# Patient Record
Sex: MALE | Race: WHITE | NOT HISPANIC OR LATINO | Employment: OTHER | URBAN - METROPOLITAN AREA
[De-identification: names, ages, dates, MRNs, and addresses within clinical notes are randomized per-mention and may not be internally consistent; named-entity substitution may affect disease eponyms.]

---

## 2019-02-11 ENCOUNTER — PATIENT OUTREACH (OUTPATIENT)
Dept: HEALTH INFORMATION MANAGEMENT | Facility: OTHER | Age: 74
End: 2019-02-11

## 2019-02-11 NOTE — PROGRESS NOTES
Outcome: Left Message    Please transfer to Patient Outreach Team at [phone] when patient returns call.    WebIZ Checked & Epic Updated:  yes    [facility]Connect Verified: yes    Attempt # 1

## 2019-02-11 NOTE — PROGRESS NOTES
Outcome: SCHEDULED SCP / AHA --- PT WILL WAIT FOR AWV    Please transfer to Patient Outreach Team at [phone] when patient returns call.    WebIZ Checked & Epic Updated:  yes    HealthConnect Verified: yes    Attempt # 2

## 2019-05-22 ENCOUNTER — OFFICE VISIT (OUTPATIENT)
Dept: MEDICAL GROUP | Facility: PHYSICIAN GROUP | Age: 74
End: 2019-05-22
Payer: MEDICARE

## 2019-05-22 VITALS
OXYGEN SATURATION: 99 % | BODY MASS INDEX: 26.05 KG/M2 | TEMPERATURE: 97.4 F | DIASTOLIC BLOOD PRESSURE: 78 MMHG | RESPIRATION RATE: 16 BRPM | WEIGHT: 182 LBS | HEIGHT: 70 IN | SYSTOLIC BLOOD PRESSURE: 132 MMHG | HEART RATE: 60 BPM

## 2019-05-22 DIAGNOSIS — M76.51 PATELLAR TENDINITIS OF RIGHT KNEE: ICD-10-CM

## 2019-05-22 DIAGNOSIS — R52 PAIN: Primary | ICD-10-CM

## 2019-05-22 PROCEDURE — 99204 OFFICE O/P NEW MOD 45 MIN: CPT | Performed by: FAMILY MEDICINE

## 2019-05-22 RX ORDER — ATORVASTATIN CALCIUM 40 MG/1
40 TABLET, FILM COATED ORAL DAILY
COMMUNITY
End: 2021-09-08 | Stop reason: SDUPTHER

## 2019-05-22 ASSESSMENT — PATIENT HEALTH QUESTIONNAIRE - PHQ9: CLINICAL INTERPRETATION OF PHQ2 SCORE: 0

## 2019-05-22 NOTE — PATIENT INSTRUCTIONS
Ask your [facility] doctor about a measles titer and if it is safe to use an anti-inflammatory like advil or aleve.    Nataliia mayer

## 2019-05-22 NOTE — LETTER
[facility]  Lo Fine M.D.  [address] [place] [address]  Fax: [phone]   Authorization for Release/Disclosure of   Protected Health Information   Name: ED ALVAREZ : 1945 SSN: [id number]   [address] [place] [address] Phone:    [phone] (home)    I authorize the entity listed below to release/disclose the PHI below to:   [facility]/Lo Fine M.D. and Lo Fine M.D.   Provider or Entity Name:  [facility]   Address   City, State, Zip   Phone:      Fax:     Reason for request: continuity of care   Information to be released:    [  ] LAST COLONOSCOPY,  including any PATH REPORT and follow-up  [  ] LAST FIT/COLOGUARD RESULT [  ] LAST DEXA  [  ] LAST MAMMOGRAM  [  ] LAST PAP  [  ] LAST LABS [  ] RETINA EXAM REPORT  [  ] IMMUNIZATION RECORDS  [X] Release all info      [  ] Check here and initial the line next to each item to release ALL health information INCLUDING  _____ Care and treatment for drug and / or alcohol abuse  _____ HIV testing, infection status, or AIDS  _____ Genetic Testing    DATES OF SERVICE OR TIME PERIOD TO BE DISCLOSED: _____________  I understand and acknowledge that:  * This Authorization may be revoked at any time by you in writing, except if your health information has already been used or disclosed.  * Your health information that will be used or disclosed as a result of you signing this authorization could be re-disclosed by the recipient. If this occurs, your re-disclosed health information may no longer be protected by State or Federal laws.  * You may refuse to sign this Authorization. Your refusal will not affect your ability to obtain treatment.  * This Authorization becomes effective upon signing and will  on (date) __________.      If no date is indicated, this Authorization will  one (1) year from the signature date.    Name: Ed Alvarez    Signature:   Date:     2019       PLEASE FAX  REQUESTED RECORDS BACK TO: [phone]

## 2019-06-07 ENCOUNTER — PHYSICAL THERAPY (OUTPATIENT)
Dept: PHYSICAL THERAPY | Facility: REHABILITATION | Age: 74
End: 2019-06-07
Attending: FAMILY MEDICINE
Payer: MEDICARE

## 2019-06-07 DIAGNOSIS — M76.51 PATELLAR TENDINITIS OF RIGHT KNEE: ICD-10-CM

## 2019-06-07 PROCEDURE — 97162 PT EVAL MOD COMPLEX 30 MIN: CPT

## 2019-06-07 PROCEDURE — 97110 THERAPEUTIC EXERCISES: CPT

## 2019-06-07 ASSESSMENT — ENCOUNTER SYMPTOMS
PAIN SCALE AT LOWEST: 0
PAIN SCALE: 0
QUALITY: SHARP
ALLEVIATING FACTORS: REST
PAIN TIMING: WHEN ACTIVE
PAIN SCALE AT HIGHEST: 6

## 2019-06-07 NOTE — OP THERAPY EVALUATION
Outpatient Physical Therapy  INITIAL EVALUATION    [facility]  [address], [address]  [place] [address]  Phone:  [phone]    Date of Evaluation: 2019    Patient: Ed Alvarez  YOB: 1945  MRN: [id number]     Referring Provider: Lo Fine M.D.  [address]  [place] [address]   Referring Diagnosis Patellar tendinitis of right knee [M76.51]     Time Calculation  Start time: 1100  Stop time: 1200 Time Calculation (min): 60 minutes     Physical Therapy Occurrence Codes    Date of onset of impairment:  19   Date physical therapy care plan established or reviewed:  19   Date physical therapy treatment started:  19          Chief Complaint: Knee Problem    Visit Diagnoses     ICD-10-CM   1. Patellar tendinitis of right knee M76.51         Subjective:   History of Present Illness:     Date of onset:  2019    Mechanism of injury:  Pt reports R knee pain that started when he bent down and put pressure on his R knee to get something off the floor about 6 weeks ago. About a week ago, he started icing it 2x/day for about 10 min each and has noted improvement in symptoms. It does not seem to hurt at any other time. It is still very tender to touch and pressure. Currently, walking and swimming, which he does for exercise, are not usually aggravating; walking very occasionally causes some discomfort, but it is minimal.    Hx: L Achilles tendon partial tear playing tennis.  Pain:     Current pain ratin    At best pain ratin    At worst pain ratin    Location:  Anterior R knee, just lateral to patellar tendon.    Quality:  Sharp    Pain timing:  When active    Relieving factors:  Rest    Exacerbated by: Touch and light pressure.    Progression:  Improving  Diagnostic Tests:     None    Treatments:     None    Patient Goals:     Patient goals for therapy:  Decreased pain and return to sport/leisure activities    Other patient  goals:  Decrease pain in knee.      Past Medical History:   Diagnosis Date   • Hyperlipidemia      Past Surgical History:   Procedure Laterality Date   • LAMINOTOMY  1980s   • TONSILLECTOMY  1950s     Social History   Substance Use Topics   • Smoking status: Former Smoker     Packs/day: 0.15     Years: 50.00     Quit date: 2007   • Smokeless tobacco: Never Used   • Alcohol use Yes      Comment: occasional      Family and Occupational History     Social History   • Marital status: Single     Spouse name: N/A   • Number of children: N/A   • Years of education: N/A       Objective     Observations     Additional Observation Details  R calf atrophy vs L.    Postural Observations  Seated posture: fair  Standing posture: fair    Additional Postural Observation Details  Forward head posture.  No significant pelvic, knee, or ankle deviations in relaxed standing.        Hip Screen   Hip range of motion within functional limits with the following exceptions: CATHI: L 75%.  R 50%.    Supine 90/90 R hip IR 30 deg c mild ollie-lateral pinching at end range.    Hamstring 90/90:  L -20  R -40      Tenderness     Additional Tenderness Details  ++TTP along R patellar tendon.  -TTP medial and lateral joint lines.    Active Range of Motion   Left Knee   Flexion: WFL  Extension: WFL    Right Knee   Flexion: WFL  Extension: WFL    Additional Active Range of Motion Details  -ROS with flxn or extn overpressure.    Patellar Mobility     Additional Patellar Mobility Details  * R patellar mobility limited inferiorly and medial-lateral directions.    Strength:      Left Hip   Planes of Motion   Abduction: 4  External rotation: 4    Right Hip   Planes of Motion   Abduction: 3+  External rotation: 3+    Tests     Right Knee   Negative lateral Steven, medial Steven and patellar apprehension.     Additional Tests Details  L ankle hypomobile, very stiff end feel, into DF.  Ambulation     Comments   Slightly limited ankle DF bilaterally in  terminal stance.  Increased femoral IR on R.    Functional Assessment     Comments  Single Limb Balance:  R: increased lateral trunk sway and ankle compensation vs. L.    Single Limb Heel Raises:  L 6.  R 0 - unable to achieve full height, although able to do multiple repetitions.        Therapeutic Exercises (CPT 86403):     1. TB massage R quad, HEP    2. Supine hip flexor stretch, 2x30 sec ea, HEP focus on R    3. S/Ling clams, 1x fatigue, HEP focus on R    4. DL Heel raises, Add to HEP next session, bias R    Therapeutic Treatments and Modalities:     1. Manual Therapy (CPT 85488), 1. FR STM R anterior hip and quad., 2. R patellar mobs, all directions., // Improved patellar mobility.    Time-based treatments/modalities:  Manual therapy minutes (CPT 31804): 5 minutes  Therapeutic exercise minutes (CPT 44625): 15 minutes       Assessment, Response and Plan:   Impairments: abnormal or restricted ROM, impaired physical strength, lacks appropriate home exercise program, limited mobility and pain with function    Assessment details:  The patient is a 73 y.o. male with c/c of pain in his R anterior knee, just lateral to the patellar tendon.  Signs/symptoms consistent with irritation to the R patellar tendon, likely due mechanism of injury involving increased stretch across anterior knee from hip flexor restriction in combination with mechanical pressure from 1/2 kneel position. Significant contributing factors include R hip extension mobility and abduction/external rotation strength deficits, and calf length and strength deficits. He will benefit from skilled PT to address the above impairments, improve tibio-femoral and patello-femoral alignment during dynamic activities, provide education on a self-managed HEP, and facilitate a safe return to PLOF with minimal to no pain.    Barriers to therapy:  None  Prognosis: good    Goals:   Short Term Goals:   1. Independent with HEP.  2. Able to walk for at least 1.5 miles,  part of his usual exercise program 2x/wk, on even surfaces at self selected pace without limitation by or aggravation of knee pain.  3. Able to ascend/descend at least one flight of stairs without aggravation of or limitation by knee pain.  Short term goal time span:  2-4 weeks      Long Term Goals:    1. Independent with HEP and progressions/regressions of exercises and activities as necessary.  2. Able to walk for at least 2.5 miles, part of his usual exercise program 3x/wk, on even surfaces at self selected pace without limitation by or aggravation of knee pain.  3. Able to kneel on floor to retrieve items from floor level without limitation by or aggravation of knee pain.  Long term goal time span:  6-8 weeks    Plan:   Therapy options:  Physical therapy treatment to continue  Planned therapy interventions:  E Stim Unattended (CPT 92488), Functional Training, Self Care (CPT 34547), Gait Training (CPT 66211), Hot or Cold Pack Therapy (CPT 57431), Manual Therapy (CPT 53741), Neuromuscular Re-education (CPT 50165), Self Care ADL Training (CPT 12279), Therapeutic Activities (CPT 79809), Therapeutic Exercise (CPT 91416) and TENS Applicaton (CPT 08738)  Frequency:  2x week  Duration in weeks:  6  Duration in visits:  12  Discussed with:  Patient      Functional Limitations and Severity Modifiers  WOMAC Grand Total: 9.38     Referring provider co-signature:  I have reviewed this plan of care and my co-signature certifies the need for services.  Certification Dates:   From 06/07/2019     To 08/02/2019    Physician Signature: ________________________________ Date: ______________

## 2019-06-13 ENCOUNTER — PHYSICAL THERAPY (OUTPATIENT)
Dept: PHYSICAL THERAPY | Facility: REHABILITATION | Age: 74
End: 2019-06-13
Attending: FAMILY MEDICINE
Payer: MEDICARE

## 2019-06-13 DIAGNOSIS — M76.51 PATELLAR TENDINITIS OF RIGHT KNEE: ICD-10-CM

## 2019-06-13 PROCEDURE — 97140 MANUAL THERAPY 1/> REGIONS: CPT

## 2019-06-13 PROCEDURE — 97112 NEUROMUSCULAR REEDUCATION: CPT

## 2019-06-13 PROCEDURE — 97110 THERAPEUTIC EXERCISES: CPT

## 2019-06-13 NOTE — OP THERAPY DAILY TREATMENT
Outpatient Physical Therapy  DAILY TREATMENT     [facility] [place] [address], [address]  [place] [address]  Phone:  [phone]    Date: 06/13/2019    Patient: Ed Alvarez  YOB: 1945  MRN: [id number]     Time Calculation  Start time: 1330  Stop time: 1410 Time Calculation (min): 40 minutes     Chief Complaint: Knee Problem    Visit #: 2    SUBJECTIVE:  Felt fine after last session. Sensitivity to touch along patellar tendon has been improving - has been improving since starting ice.    OBJECTIVE:  Current objective measures:  P1: R patellar tendon, very ++TTP. Unable to kneel.    Alignment:  R lateral trunk shift - likely compensation for prior L knee injury.          Therapeutic Exercises (CPT 94483):     1. SKTC, 2x30 sec, HEP    2. S/Ling clams, HEP    3. S/Ling hip abd daniel hold, With PT assist, R 1x7, 5 sec holds, // More difficult on R.    4. Prone hip rotations, Raquel, L more difficult to control.    6. Squats c raquel UE support, 2x7, // No discomfort.    7. Heel raises, DL 1x15.  DL biasing R 1x12.    Therapeutic Treatments and Modalities:     1. Manual Therapy (CPT 04055), 1. FR STM R quad and anterior hip., 2. Patellar mobs all directions; gentle medial-lateral mobilizations to patellar tendon.    4. Neuromuscular Re-education (CPT 48463), 1. Correction of trunk position from R lateral shift to neutral.    Time-based treatments/modalities:  Manual therapy minutes (CPT 68660): 10 minutes  Therapeutic exercise minutes (CPT 32937): 20 minutes  Neuromusc re-ed, balance, coor, post minutes (CPT 31921): 10 minutes       Pain rating before treatment: 0  Pain rating after treatment: 0    ASSESSMENT:   R lateral trunk shift likely due to previous L knee injury, R calf weakness, and R hip abduction/ER weakness are likely contributing to increased R quadriceps use and excessive stress through R patellar tendon.    Will benefit from continued skilled PT to address these  impairments and to progress HEP to facilitate improved symmetry of weight bearing and reduce risk of future injury.    PLAN/RECOMMENDATIONS:   Plan for treatment: therapy treatment to continue next visit.  Planned interventions for next visit: continue with current treatment.

## 2019-06-21 ENCOUNTER — PHYSICAL THERAPY (OUTPATIENT)
Dept: PHYSICAL THERAPY | Facility: REHABILITATION | Age: 74
End: 2019-06-21
Attending: FAMILY MEDICINE
Payer: MEDICARE

## 2019-06-21 DIAGNOSIS — M76.51 PATELLAR TENDINITIS OF RIGHT KNEE: ICD-10-CM

## 2019-06-21 PROCEDURE — 97140 MANUAL THERAPY 1/> REGIONS: CPT

## 2019-06-21 PROCEDURE — 97110 THERAPEUTIC EXERCISES: CPT

## 2019-06-21 NOTE — OP THERAPY DAILY TREATMENT
Outpatient Physical Therapy  DAILY TREATMENT     [facility] Outpatient Physical Therapy [place] [address] [facility], [address]  [place] [address]  Phone:  [phone]    Date: 06/21/2019    Patient: Ed Alvarez  YOB: 1945  MRN: [id number]     Time Calculation  Start time: 0930  Stop time: 1000 Time Calculation (min): 30 minutes     Chief Complaint: Knee Problem    Visit #: 3    SUBJECTIVE:  Felt fine after last session. Knee has been feeling okay. He has kneeled a few times and knee has felt the same. He is able to touch it without pain, but kneeling still hurts.    OBJECTIVE:  Current objective measures:  P1: R patellar tendon, very +TTP. Able to kneel partially on knee.  * +tiss restrictions and swelling noted along lateral patellar area; increased tension in R patella vs L.    Alignment:  R lateral trunk shift - likely compensation for prior L knee injury.          Therapeutic Exercises (CPT 19772):     1. Singaporean ball rolls.  SKTC, 2x30 sec, HEP    2. S/Ling clams, HEP    3. S/Ling hip abd daniel hold, With PT assist, R 1x7, 5 sec holds, // More difficult on R.    4. Prone hip rotations, Raquel, L more difficult to control.    6. Squats c raquel UE support, 2x7, // No discomfort.    7. Heel raises, DL 1x15.  DL biasing R 1x12.    8. * SKTC stretch c towel posterior knee, 3x30 sec, HEP    Therapeutic Treatments and Modalities:     1. Manual Therapy (CPT 59105), 1. FR STM R quad and anterior hip., 2. Posterior tibial mobs, hk lying, 3x20 sec, 2 rounds.    Time-based treatments/modalities:  Manual therapy minutes (CPT 42576): 15 minutes  Therapeutic exercise minutes (CPT 43811): 15 minutes       Pain rating before treatment: 0  Pain rating after treatment: 0    ASSESSMENT:   Responded well to posterior tibial mobs with slight lateral bias with improved tolerance for kneeling on R knee.     Will benefit from continued skilled PT to address these impairments and to progress HEP to facilitate improved symmetry of weight  bearing and reduce risk of future injury.    PLAN/RECOMMENDATIONS:   Plan for treatment: therapy treatment to continue next visit.  Planned interventions for next visit: continue with current treatment.

## 2019-06-26 ENCOUNTER — PHYSICAL THERAPY (OUTPATIENT)
Dept: PHYSICAL THERAPY | Facility: REHABILITATION | Age: 74
End: 2019-06-26
Attending: FAMILY MEDICINE
Payer: MEDICARE

## 2019-06-26 DIAGNOSIS — M76.51 PATELLAR TENDINITIS OF RIGHT KNEE: ICD-10-CM

## 2019-06-26 PROCEDURE — 97140 MANUAL THERAPY 1/> REGIONS: CPT

## 2019-06-26 PROCEDURE — 97110 THERAPEUTIC EXERCISES: CPT

## 2019-06-26 NOTE — OP THERAPY DAILY TREATMENT
Outpatient Physical Therapy  DAILY TREATMENT     [facility] Outpatient Physical Therapy [place] [address], [address]  [place] [address]  Phone:  [phone]    Date: 06/26/2019    Patient: Ed Alvarez  YOB: 1945  MRN: [id number]     Time Calculation  Start time: 0930  Stop time: 1000 Time Calculation (min): 30 minutes     Chief Complaint: Knee Problem    Visit #: 4    SUBJECTIVE:  Felt fine after last session. Overall, knee is improving gradually.    OBJECTIVE:  Current objective measures:  P1: R patellar tendon, very +TTP. Able to kneel partially on knee.  * +tiss restrictions and swelling noted along lateral patellar area; increased tension in R patella vs L.    Alignment:  R lateral trunk shift - likely compensation for prior L knee injury.          Therapeutic Exercises (CPT 94207):     1. Gambian ball rolls.  SKTC, 2x30 sec, HEP    2. S/Ling clams, HEP    3. S/Ling hip abd daniel hold, With PT assist, R 1x7, 5 sec holds, Not done 6/26/19.    4. Prone hip rotations, Raquel, L more difficult to control.    6. Squats c raquel UE support, 2x7, // No discomfort.    7. * Heel raises, DL 1x15.  DL biasing R 1x12.    Therapeutic Treatments and Modalities:     1. Manual Therapy (CPT 93664), * 1. Posterior tibial mobs, hk lying, 3x20 sec, 2 rounds., // Improved knee flxn ROM c overpressure., * 2. STM patellar tendon and borders, prox anterior compartment., // Improved wt bearing capacity on R knee. Improved knee flxn ROM c overpressure.    Therapeutic Treatment and Modalities Summary: Previous:  1. FR STM R quad and anterior hip.    Time-based treatments/modalities:  Manual therapy minutes (CPT 40087): 15 minutes  Therapeutic exercise minutes (CPT 05700): 15 minutes       Pain rating before treatment: 0  Pain rating after treatment: 0    ASSESSMENT:   Progressing gradually with improved wt bearing tolerance on R knee in kneeling, but still painful and different from L knee. Knee flx with overpressure improved and wt  bearing tolerance improved at end of session today. Joint accessory motion and local soft tissue restrictions likely contributing to symptoms.    Will benefit from continued skilled PT to address these impairments and to progress HEP to facilitate improved symmetry of weight bearing and reduce risk of future injury.    PLAN/RECOMMENDATIONS:   Plan for treatment: therapy treatment to continue next visit.  Planned interventions for next visit: continue with current treatment.

## 2019-07-05 ENCOUNTER — PHYSICAL THERAPY (OUTPATIENT)
Dept: PHYSICAL THERAPY | Facility: REHABILITATION | Age: 74
End: 2019-07-05
Attending: FAMILY MEDICINE
Payer: MEDICARE

## 2019-07-05 DIAGNOSIS — M76.51 PATELLAR TENDINITIS OF RIGHT KNEE: ICD-10-CM

## 2019-07-05 PROCEDURE — 97140 MANUAL THERAPY 1/> REGIONS: CPT

## 2019-07-05 PROCEDURE — 97110 THERAPEUTIC EXERCISES: CPT

## 2019-07-05 NOTE — OP THERAPY DAILY TREATMENT
Outpatient Physical Therapy  DAILY TREATMENT     [facility] Outpatient Physical Therapy [place] [address] [facility], [address]  [place] [address]  Phone:  [phone]    Date: 07/05/2019    Patient: Ed Alvarez  YOB: 1945  MRN: [id number]     Time Calculation  Start time: 1000  Stop time: 1030 Time Calculation (min): 30 minutes     Chief Complaint: Knee Problem    Visit #: 5    SUBJECTIVE:  Knee feels the same. Had pain kneeling on floor this past week.    OBJECTIVE:  Current objective measures:  P1: R patellar tendon, very +TTP. Able to kneel partially on knee.  * +tiss restrictions noted along L lateral patella.  Kneeling on R knee slightly more sore than last session.          Therapeutic Exercises (CPT 57678):     1. Standing quad stretch, HEP    2. Swiss ball rolls.  SKTC, 1x20 / 2x1min ea, HEP    3. Prone hip rotations, 2x30 sec    7. * Heel raises, DL 1x15.  DL biasing R 1x12.    Therapeutic Treatments and Modalities:     1. Manual Therapy (CPT 59088), 1. Evangelista Test position, IASTM L patellar tendon, x2., // Mild improvement in kneeling tolerance., 2. Evangelista Test position, IASTM L distal, lateral quad, x2., // Significantly improved kneeling tolerance.    Therapeutic Treatment and Modalities Summary: Previous:  * 1. Posterior tibial mobs, hk lying, 3x20 sec, 2 rounds.  1. FR STM R quad and anterior hip.  * 2. STM patellar tendon and borders, prox anterior compartment.    Time-based treatments/modalities:  Manual therapy minutes (CPT 95455): 20 minutes  Therapeutic exercise minutes (CPT 17073): 10 minutes       Direct ice, 3 min, seated, end of session.    Pain rating before treatment: 0  Pain rating after treatment: 0    ASSESSMENT:   Tolerance for kneeling on L knee improved significantly after IASTM to distal lateral quad.    Will benefit from continued skilled PT to address these impairments and to progress HEP to facilitate improved symmetry of weight bearing and reduce risk of future  injury.    PLAN/RECOMMENDATIONS:   Plan for treatment: therapy treatment to continue next visit.  Planned interventions for next visit: continue with current treatment.

## 2019-07-12 ENCOUNTER — PHYSICAL THERAPY (OUTPATIENT)
Dept: PHYSICAL THERAPY | Facility: REHABILITATION | Age: 74
End: 2019-07-12
Attending: FAMILY MEDICINE
Payer: MEDICARE

## 2019-07-12 DIAGNOSIS — M76.51 PATELLAR TENDINITIS OF RIGHT KNEE: ICD-10-CM

## 2019-07-12 PROCEDURE — 97110 THERAPEUTIC EXERCISES: CPT

## 2019-07-12 PROCEDURE — 97140 MANUAL THERAPY 1/> REGIONS: CPT

## 2019-07-12 NOTE — OP THERAPY DISCHARGE SUMMARY
Outpatient Physical Therapy  DISCHARGE SUMMARY NOTE      [facility] [place]  [address], [address]  [place] [address]  Phone:  [phone]    Date of Visit: 07/12/2019    Patient: Ed Alvarez  YOB: 1945  MRN: [id number]     Referring Provider: Lo Fine M.D.  [address]  [facility] [address]  [place] [address]   Referring Diagnosis Patellar tendinitis, right knee [M76.51]     Physical Therapy Occurrence Codes    Date of onset of impairment:  5/22/19   Date physical therapy care plan established or reviewed:  6/7/19   Date physical therapy treatment started:  6/7/19          Functional Limitations and Severity Modifiers  WOMAC Grand Total: 2.08       Your patient is being discharged from Physical Therapy with the following comments:   · Goals partially met    Comments:  Overall, the patient has made moderate progress with PT. He reports less pain with kneeling on his R, but it is still uncomfortable vs his L side. He demonstrates good technique with his HEP and states he is comfortable with DC from PT.         Recommendations:  DC from PT.    Russell Matias, PT, DPT, OCS    Date: 7/12/2019

## 2019-07-12 NOTE — OP THERAPY DAILY TREATMENT
Outpatient Physical Therapy  DAILY TREATMENT     [facility] Outpatient Physical Therapy [place] [address] [facility], [address]  [place] [address]  Phone:  [phone]    Date: 07/12/2019    Patient: Ed Alvarez  YOB: 1945  MRN: [id number]     Time Calculation  Start time: 0830  Stop time: 0900 Time Calculation (min): 30 minutes     Chief Complaint: Knee Problem    Visit #: 6    SUBJECTIVE:  Ability to kneel has improved gradually. Hard to tell if it has been the ice, time, or PT. Will continue working on calf strength.    OBJECTIVE:  Current objective measures:  P1: R patellar tendon, mild +TTP. Able to kneel partially on knee.  * +tiss restrictions noted along L lateral patella.          Therapeutic Exercises (CPT 71842):     1. Standing quad stretch, HEP    2. Swiss ball rolls.  SKTC, 1x20 / 2x1min ea, HEP    3. Prone hip rotations, 2x30 sec, Review    4. S/L clams, Green, 2x12, HEP    7. * Heel raises, DL biasing R 1x12, 1x15., HEP    Therapeutic Treatments and Modalities:     1. Manual Therapy (CPT 99677), 1. Evangelista Test position, IASTM L patellar tendon, x2., 2. Evangelista Test position, IASTM L distal, lateral quad, x2., // Slightly more discomfort kneeling.    Therapeutic Treatment and Modalities Summary: Previous:  * 1. Posterior tibial mobs, hk lying, 3x20 sec, 2 rounds.  1. FR STM R quad and anterior hip.  * 2. STM patellar tendon and borders, prox anterior compartment.    Time-based treatments/modalities:  Manual therapy minutes (CPT 71008): 10 minutes  Therapeutic exercise minutes (CPT 32417): 20 minutes       Direct ice, 3 min, seated, end of session.    Pain rating before treatment: 0  Pain rating after treatment: 0    ASSESSMENT:   Overall, patient has made moderate progress and is able to kneel with slightly less discomfort. He demonstrated good technique with his HEP and states he is comfortable with DC from PT today.      PLAN/RECOMMENDATIONS:   Plan for treatment: Discharge patient due to  partially achievd goals..

## 2019-07-16 ENCOUNTER — TELEPHONE (OUTPATIENT)
Dept: MEDICAL GROUP | Facility: PHYSICIAN GROUP | Age: 74
End: 2019-07-16

## 2019-07-16 NOTE — TELEPHONE ENCOUNTER
ESTABLISHED PATIENT PRE-VISIT PLANNING     Patient was NOT contacted to complete PVP.     Note: Patient will not be contacted if there is no indication to call.     1.  Reviewed notes from the last few office visits within the medical group: Yes    2.  If any orders were placed at last visit or intended to be done for this visit (i.e. 6 mos follow-up), do we have Results/Consult Notes?        •  Labs - Labs were not ordered at last office visit.   Note: If patient appointment is for lab review and patient did not complete labs, check with provider if OK to reschedule patient until labs completed.       •  Imaging - Imaging was not ordered at last office visit.       •  Referrals - Referral ordered, patient was seen and consult notes are in chart. Care Teams updated  NO.    3. Is this appointment scheduled as a Hospital Follow-Up? No    4.  Immunizations were updated in Epic using WebIZ?: Epic matches WebIZ       •  Web Iz Recommendations: PREVNAR (PCV13) , TDAP, VARICELLA (Chicken Pox)  and SHINGRIX (Shingles)    5.  Patient is due for the following Health Maintenance Topics:   Health Maintenance Due   Topic Date Due   • Annual Wellness Visit  1945   • HEPATITIS C SCREENING  1945   • IMM DTaP/Tdap/Td Vaccine (1 - Tdap) 07/18/1966   • COLONOSCOPY  07/18/1997   • IMM ZOSTER VACCINES (1 of 2) 07/18/1997   • IMM PNEUMOCOCCAL 65+ (ADULT) LOW/MEDIUM RISK SERIES (1 of 2 - PCV13) 07/18/2012       - Patient has completed FLU Immunization(s) per WebIZ. Chart has been updated.    6. Orders for overdue Health Maintenance topics pended in Pre-Charting? NO    7.  AHA (MDX) form printed for Provider? YES    8.  Patient was NOT informed to arrive 15 min prior to their scheduled appointment and bring in their medication bottles.

## 2019-07-22 ENCOUNTER — APPOINTMENT (OUTPATIENT)
Dept: MEDICAL GROUP | Facility: PHYSICIAN GROUP | Age: 74
End: 2019-07-22
Payer: MEDICARE

## 2019-08-28 ENCOUNTER — PATIENT OUTREACH (OUTPATIENT)
Dept: HEALTH INFORMATION MANAGEMENT | Facility: OTHER | Age: 74
End: 2019-08-28

## 2019-08-28 NOTE — PROGRESS NOTES
Outcome: Left Message    Please transfer to Patient Outreach Team at [phone] when patient returns call.    }    Attempt # 1

## 2020-02-06 ENCOUNTER — OFFICE VISIT (OUTPATIENT)
Dept: MEDICAL GROUP | Facility: PHYSICIAN GROUP | Age: 75
End: 2020-02-06
Payer: MEDICARE

## 2020-02-06 VITALS
RESPIRATION RATE: 20 BRPM | TEMPERATURE: 97.7 F | DIASTOLIC BLOOD PRESSURE: 74 MMHG | SYSTOLIC BLOOD PRESSURE: 124 MMHG | WEIGHT: 179.2 LBS | OXYGEN SATURATION: 96 % | HEIGHT: 70 IN | HEART RATE: 82 BPM | BODY MASS INDEX: 25.65 KG/M2

## 2020-02-06 DIAGNOSIS — M70.62 GREATER TROCHANTERIC BURSITIS OF BOTH HIPS: ICD-10-CM

## 2020-02-06 DIAGNOSIS — M70.61 GREATER TROCHANTERIC BURSITIS OF BOTH HIPS: ICD-10-CM

## 2020-02-06 PROBLEM — M25.551 PAIN OF BOTH HIP JOINTS: Status: ACTIVE | Noted: 2020-02-06

## 2020-02-06 PROBLEM — M25.552 PAIN OF BOTH HIP JOINTS: Status: ACTIVE | Noted: 2020-02-06

## 2020-02-06 PROCEDURE — 99214 OFFICE O/P EST MOD 30 MIN: CPT | Performed by: FAMILY MEDICINE

## 2020-02-06 NOTE — ASSESSMENT & PLAN NOTE
This is a new condition.  Onset: 2-3 months  Location: lateral, bilateral hip, L>R, right no longer present  Duration: intermittent  Timing: mostly at night, affecting sleep  Quality: deep ache  Precipitating trauma: none  Associated symptoms: +left lumbar pain - last week, +muscle spasm - last week  Home treatments: acetaminophen, ibuprofen - helps    He does take a walk in the hills every morning, 1-1.5 miles. He also swims 3x/week and the last time he swam, it hurt in his hip.

## 2020-02-06 NOTE — PROGRESS NOTES
"Subjective:     CC: hip pain    HPI:   Ed presents today with     Pain of both hip joints  This is a new condition.  Onset: 2-3 months  Location: lateral, bilateral hip, L>R, right no longer present  Duration: intermittent  Timing: mostly at night, affecting sleep  Quality: deep ache  Precipitating trauma: none  Associated symptoms: +left lumbar pain - last week, +muscle spasm - last week  Home treatments: acetaminophen, ibuprofen - helps    He does take a walk in the hills every morning, 1-1.5 miles. He also swims 3x/week and the last time he swam, it hurt in his hip.        Past Medical History:   Diagnosis Date   • Hyperlipidemia        Social History     Tobacco Use   • Smoking status: Former Smoker     Packs/day: 0.15     Years: 50.00     Pack years: 7.50     Last attempt to quit:      Years since quittin.1   • Smokeless tobacco: Never Used   Substance Use Topics   • Alcohol use: Yes     Comment: occasional    • Drug use: No       Current Outpatient Medications Ordered in Epic   Medication Sig Dispense Refill   • atorvastatin (LIPITOR) 40 MG Tab Take 40 mg by mouth every day.     • Cyanocobalamin (VITAMIN B12 PO) Take  by mouth.     • Cholecalciferol (VITAMIN D-3 PO) Take  by mouth.       No current Epic-ordered facility-administered medications on file.        Allergies:  Patient has no known allergies.    Health Maintenance:   Reviewing records    ROS:  Gen: no fevers/chills  Pulm: no sob  CV: no chest pain    Objective:       Exam:  /74 (BP Location: Left arm, Patient Position: Sitting, BP Cuff Size: Adult)   Pulse 82   Temp 36.5 °C (97.7 °F) (Temporal)   Resp 20   Ht 1.778 m (5' 10\")   Wt 81.3 kg (179 lb 3.2 oz)   SpO2 96%   BMI 25.71 kg/m²  Body mass index is 25.71 kg/m².    Gen: Alert and oriented, No apparent distress.  Neck: Neck is supple without lymphadenopathy.  Lungs: Normal effort, CTA bilaterally, no wheezes, rhonchi, or rales  CV: Regular rate and rhythm. No murmurs, " rubs, or gallops.  Ext: No clubbing, cyanosis, edema.  Hip:  Full ROM, full strength, TTP over greater trochanter bilaterally, FADIR test negative bilaterally  Back:  Full ROM, 5/5 LE strength, sensation intact bilaterally in LE, no TTP over spinous processes, paraspinals non-tender, SI joint non-tender, straight leg raise negative, Mario test negative    Assessment & Plan:     74 y.o. male with the following -     1. Greater trochanteric bursitis of both hips  This is a new condition.  For last 2-3 months he has had lateral bilateral hip pain with left side greater than the right.  He states the right pain is no longer present but he still getting pain on the left side.  It mostly occurs at night and is been affecting his sleep when he sleeps on his side.  Last week he did have an episode of left lumbar pain with muscle spasms that he thinks was related to hip pain.  Has been responding to acetaminophen and ibuprofen.  On exam his greater trochanter bursa is tender to palpation and there is no actual pain in the hip joint itself so I suspect greater trochanter bursitis.  - REFERRAL TO PHYSICAL THERAPY Reason for Therapy: Eval/Treat/Report  -If continued pain despite physical therapy, corticosteroid injection    Return in about 6 weeks (around 3/19/2020) for trochanter bursitis, injection?.    Please note that this dictation was created using voice recognition software. I have made every reasonable attempt to correct obvious errors, but I expect that there are errors of grammar and possibly content that I did not discover before finalizing the note.

## 2020-02-19 ENCOUNTER — APPOINTMENT (OUTPATIENT)
Dept: PHYSICAL THERAPY | Facility: REHABILITATION | Age: 75
End: 2020-02-19
Attending: FAMILY MEDICINE
Payer: MEDICARE

## 2020-02-24 ENCOUNTER — APPOINTMENT (OUTPATIENT)
Dept: PHYSICAL THERAPY | Facility: REHABILITATION | Age: 75
End: 2020-02-24
Attending: FAMILY MEDICINE
Payer: MEDICARE

## 2020-02-26 ENCOUNTER — PHYSICAL THERAPY (OUTPATIENT)
Dept: PHYSICAL THERAPY | Facility: REHABILITATION | Age: 75
End: 2020-02-26
Attending: FAMILY MEDICINE
Payer: MEDICARE

## 2020-02-26 DIAGNOSIS — M70.62 GREATER TROCHANTERIC BURSITIS OF BOTH HIPS: ICD-10-CM

## 2020-02-26 DIAGNOSIS — M70.61 GREATER TROCHANTERIC BURSITIS OF BOTH HIPS: ICD-10-CM

## 2020-02-26 PROCEDURE — 97110 THERAPEUTIC EXERCISES: CPT

## 2020-02-26 PROCEDURE — 97162 PT EVAL MOD COMPLEX 30 MIN: CPT

## 2020-02-26 SDOH — ECONOMIC STABILITY: GENERAL: QUALITY OF LIFE: GOOD

## 2020-02-26 ASSESSMENT — ENCOUNTER SYMPTOMS
ALLEVIATING FACTORS: PAIN MEDICATION
QUALITY: DULL ACHE
EXACERBATED BY: WALKING
PAIN SCALE AT LOWEST: 0
PAIN TIMING: CONSTANT
PAIN SCALE: 1
EXACERBATED BY: PROLONGED STANDING
AWAKENINGS PER NIGHT: 2
ALLEVIATING FACTORS: ACTIVITY MODIFICATION
PAIN SCALE AT HIGHEST: 5

## 2020-02-26 NOTE — OP THERAPY EVALUATION
Outpatient Physical Therapy  INITIAL EVALUATION    [facility] [place]  [address]  [place] [address]  Phone:  [phone]    Date of Evaluation: 2020    Patient: Ed Alvarez  YOB: 1945  MRN: [id number]     Referring Provider: Lo Fine M.D.  [address]  [place] [address]   Referring Diagnosis Trochanteric bursitis, right hip [M70.61];Trochanteric bursitis, left hip [M70.62]     Time Calculation  Start time: 1400  Stop time: 1500 Time Calculation (min): 60 minutes           Chief Complaint: Hip Problem    Visit Diagnoses     ICD-10-CM   1. Greater trochanteric bursitis of both hips M70.61    M70.62         Subjective:   History of Present Illness:     Date of onset:  2019    Mechanism of injury:  The patient is a 74 year old male who reports having (R) hip pain then (L) hip pain ~ 2 months ago. He has experienced muscle spasms superior to his (L) hip during the night. He started taking Acetaminophen and then Ibuprofen. The pain primarily still in the (R) hip and then the (L) but no muscle spasms. He has hx of (R) knee bursitis long ago. Hx of (L) knee had a fall in . He swims 3x/week and walks 1-1.5 -2.5 miles at times.  Quality of life:  Good  Sleep disturbance:  Interrupted sleep  # Times/Night awakened:  2  Pain:     Current pain ratin    At best pain ratin    At worst pain ratin    Quality:  Dull ache    Pain timing:  Constant    Relieving factors:  Activity modification and pain medication    Aggravating factors:  Prolonged standing and walking    Pain Comments::  Sidelying on (R) hip increases his pain but he likes to sleep on his (R) side.  Social Support:     Lives in:  One-story house  Hand dominance:  Right  Treatments:     Previous treatment:  Medication    Current treatment:  Medication  Patient Goals:     Other patient goals:  Increase mobility and decrease pain       Past Medical History:    Diagnosis Date   • Hyperlipidemia      Past Surgical History:   Procedure Laterality Date   • LAMINOTOMY     • TONSILLECTOMY  s     Social History     Tobacco Use   • Smoking status: Former Smoker     Packs/day: 0.15     Years: 50.00     Pack years: 7.50     Last attempt to quit:      Years since quittin.1   • Smokeless tobacco: Never Used   Substance Use Topics   • Alcohol use: Yes     Comment: occasional      Family and Occupational History     Socioeconomic History   • Marital status: Single     Spouse name: Not on file   • Number of children: Not on file   • Years of education: Not on file   • Highest education level: Not on file   Occupational History   • Not on file       Objective     Palpation     Right   Tenderness of the gluteus atul and TFL.     Additional Palpation Details  (R) anterior hip tenderness upon point palpation near ASIS     Active Range of Motion   Left Hip   Normal active range of motion    Right Hip   Normal active range of motion    Strength:      Left Hip   Planes of Motion   Flexion: 5  Extension: 5  Abduction: 5  Adduction: 5  External rotation: 5  Internal rotation: 5    Right Hip   Planes of Motion   Flexion: 5  Extension: 5  Abduction: 5  Adduction: 5  External rotation: 5  Internal rotation: 5    Tests     Left Hip   Negative CATHI, FADIR, SI compression and SI distraction.   Evangelista: Negative.   Modified Evangelista: Negative.     Right Hip   Positive modified Anthony.   Negative CATHI, FADIR, SI compression and SI distraction.   Evangelista: Negative.    Modified Evangelista: Negative.         Therapeutic Exercises (CPT 60624):     1. SLR's , 2 x10 reps     2. Sidelying ITB stretch, 2 minutes each side       Time-based treatments/modalities:  Therapeutic exercise minutes (CPT 20279): 10 minutes       Assessment, Response and Plan:   Assessment details:  The patient is a 74 year old male who reports having (R) and (L) hip pain. He apparently developed hip pain ~ 2 months ago  and recently had cramping and muscle spasms to the low back while awaking one morning. He currently has difficulty with putting his socks on and performing heavy tasks weightbearing prolonged periods. The patient has increased tenderness to the (R) ITB and gluteal region upon point palpation. He also has tenderness near the ASIS joint with palpation. He has pain lying on his (R) and (L) side at night.  The patient has signs and symptoms of hip bursitis and would benefit from manual therapy techniques, flexibility ans stretching techniques and strength and conditioning to improve his functional activity.  Barriers to therapy:  None  Prognosis: good    Goals:   Short Term Goals:   1) Indep with HEP   2) Able to perform infrequent standing tasks 25-50% of the time on his feet  3) Donning and doffing shoes and socks with 1 grade less pain on VAS   Short term goal time span:  2-4 weeks      Long Term Goals:    1) Progression/regression advancing with HEP   2) Able to walk longer distances with less hip pain 50% or more   3) Able to sleep at night on either side with less interruptions 50% or more   Long term goal time span:  4-6 weeks    Plan:   Therapy options:  Physical therapy treatment to continue  Planned therapy interventions:  E Stim Unattended (CPT 20435), Functional Training, Self Care (CPT 73693), Gait Training (CPT 10203), Manual Therapy (CPT 31801), Neuromuscular Re-education (CPT 86218), Self Care ADL Training (CPT 33302), Therapeutic Activities (CPT 65553) and Therapeutic Exercise (CPT 30372)  Frequency:  2x week  Duration in weeks:  6  Duration in visits:  12  Discussed with:  Patient      Functional Assessment Used        Referring provider co-signature:  I have reviewed this plan of care and my co-signature certifies the need for services.  Certification Dates:   From 02/26/20   To 04/08/20    Physician Signature: ________________________________ Date: ______________

## 2020-02-26 NOTE — OP THERAPY EVALUATION
Outpatient Physical Therapy  INITIAL EVALUATION    [facility] [place]  [address], [address]  [place] [address]  Phone:  [phone]    Date of Evaluation: 2020    Patient: Ed Alvarez  YOB: 1945  MRN: [id number]     Referring Provider: Lo Fine M.D.  [address]  [place] [address]   Referring Diagnosis No admission diagnoses are documented for this encounter.     Time Calculation                   Chief Complaint: No chief complaint on file.    Visit Diagnoses     ICD-10-CM   1. Greater trochanteric bursitis of both hips M70.61    M70.62         Subjective:   History of Present Illness:     Mechanism of injury:  The patient is a 74 year old male who reports       Past Medical History:   Diagnosis Date   • Hyperlipidemia      Past Surgical History:   Procedure Laterality Date   • LAMINOTOMY     • TONSILLECTOMY       Social History     Tobacco Use   • Smoking status: Former Smoker     Packs/day: 0.15     Years: 50.00     Pack years: 7.50     Last attempt to quit:      Years since quittin.1   • Smokeless tobacco: Never Used   Substance Use Topics   • Alcohol use: Yes     Comment: occasional      Family and Occupational History     Socioeconomic History   • Marital status: Single     Spouse name: Not on file   • Number of children: Not on file   • Years of education: Not on file   • Highest education level: Not on file   Occupational History   • Not on file       Objective    Exercises/Treatment  Time-based treatments/modalities:          Assessment/Response/Plan    Functional Assessment Used        Referring provider co-signature:  I have reviewed this plan of care and my co-signature certifies the need for services.  Certification Dates:   From 20   To {Future Date:53147}    Physician Signature: ________________________________ Date: ______________

## 2020-02-27 NOTE — OP THERAPY DAILY TREATMENT
Outpatient Physical Therapy  DAILY TREATMENT     [facility] [address], [address]  [place] [address]  Phone:  [phone]    Date: 02/28/2020    Patient: Ed Alvarez  YOB: 1945  MRN: [id number]     Time Calculation  Start time: 1300  Stop time: 1330 Time Calculation (min): 30 minutes       Chief Complaint: Hip Problem    Visit #: 2    SUBJECTIVE:  The patient reports having (R) hip pain more to the (L) side lying down at night.     OBJECTIVE:  Current objective measures:     decrease tenderness to the (R) hip      Therapeutic Exercises (CPT 22160):     1. SLR's , 1 x10 reps     2. Sidelying ITB stretch, 2 minutes each side     3. Hip abduction, 1 x10 reps     4. Bridges with adduction/abduction , 1 x10 reps     5. Squats    6. Upright bike, 1 x10 reps     Therapeutic Treatments and Modalities:     1. Manual Therapy (CPT 80216), (R) hip , IASTM to the (R) hip for 10 inutes to gluteal and ITB    2. E Stim Unattended (CPT 49106), (R) hip 15 minutes of IFC electrical stimulation with MHP     Time-based treatments/modalities:  Manual therapy minutes (CPT 29212): 15 minutes  Therapeutic exercise minutes (CPT 91092): 15 minutes       ASSESSMENT:   Response to treatment:   IASTM to the (R) gluteal and ITB; moderate pressure with decreased pain following treatment after 10 minutes; patient able to lie on his (R) sdie with less pain.    PLAN/RECOMMENDATIONS:   Plan for treatment: therapy treatment to continue next visit.  Planned interventions for next visit: continue with current treatment.

## 2020-02-28 ENCOUNTER — PHYSICAL THERAPY (OUTPATIENT)
Dept: PHYSICAL THERAPY | Facility: REHABILITATION | Age: 75
End: 2020-02-28
Attending: FAMILY MEDICINE
Payer: MEDICARE

## 2020-02-28 DIAGNOSIS — M70.61 GREATER TROCHANTERIC BURSITIS OF BOTH HIPS: ICD-10-CM

## 2020-02-28 DIAGNOSIS — M70.62 GREATER TROCHANTERIC BURSITIS OF BOTH HIPS: ICD-10-CM

## 2020-02-28 PROCEDURE — 97110 THERAPEUTIC EXERCISES: CPT

## 2020-02-28 PROCEDURE — 97140 MANUAL THERAPY 1/> REGIONS: CPT

## 2020-02-28 PROCEDURE — 97014 ELECTRIC STIMULATION THERAPY: CPT

## 2020-03-02 ENCOUNTER — APPOINTMENT (OUTPATIENT)
Dept: PHYSICAL THERAPY | Facility: REHABILITATION | Age: 75
End: 2020-03-02
Attending: FAMILY MEDICINE
Payer: MEDICARE

## 2020-03-04 ENCOUNTER — PHYSICAL THERAPY (OUTPATIENT)
Dept: PHYSICAL THERAPY | Facility: REHABILITATION | Age: 75
End: 2020-03-04
Attending: FAMILY MEDICINE
Payer: MEDICARE

## 2020-03-04 DIAGNOSIS — M70.62 GREATER TROCHANTERIC BURSITIS OF BOTH HIPS: ICD-10-CM

## 2020-03-04 DIAGNOSIS — M70.61 GREATER TROCHANTERIC BURSITIS OF BOTH HIPS: ICD-10-CM

## 2020-03-04 PROCEDURE — 97140 MANUAL THERAPY 1/> REGIONS: CPT

## 2020-03-04 PROCEDURE — 97014 ELECTRIC STIMULATION THERAPY: CPT

## 2020-03-04 PROCEDURE — 97110 THERAPEUTIC EXERCISES: CPT

## 2020-03-04 NOTE — OP THERAPY DAILY TREATMENT
Outpatient Physical Therapy  DAILY TREATMENT     [facility] [address], [address]  [place] [address]  Phone:  [phone]    Date: 03/04/2020    Patient: Ed Alvarez  YOB: 1945  MRN: [id number]     Time Calculation  Start time: 1400  Stop time: 1430 Time Calculation (min): 30 minutes       Chief Complaint: Hip Problem    Visit #: 3    SUBJECTIVE:  The patient reports having more cramping to the hamstrings lately while doing his bridging exercises. He has less pain onto his (L) side while lying on his side at night.    OBJECTIVE:  Current objective measures:     decrease pain to the (R) hip and gluteal region; hamstring muscles      Therapeutic Exercises (CPT 17162):     1. SLR's , 1 x10 reps     2. Sidelying ITB stretch, 3 minutes each side     3. Hip abduction, 1 x10 reps     4. Bridges with adduction/abduction     5. Bridges over ball, 1 x10 reps     6. Upright bike, 5 minutes    Therapeutic Treatments and Modalities:     1. Manual Therapy (CPT 98051), (R) hip , IASTM to the (R) hip for 10 minutes to gluteal and bilateral hamstring group in prone     2. E Stim Unattended (CPT 11789), (R) hip 15 minutes of IFC electrical stimulation with MHP     Time-based treatments/modalities:  Manual therapy minutes (CPT 71430): 15 minutes  Therapeutic exercise minutes (CPT 49390): 15 minutes       ASSESSMENT:   Response to treatment:   IASTM to the (R) gluteal and hamstring regions bilaterally; patient had less connective tissue tension to the hamstrings during bridging using thera-ball patient reported.     PLAN/RECOMMENDATIONS:   Plan for treatment: therapy treatment to continue next visit.  Planned interventions for next visit: continue with current treatment.

## 2020-03-06 ENCOUNTER — PHYSICAL THERAPY (OUTPATIENT)
Dept: PHYSICAL THERAPY | Facility: REHABILITATION | Age: 75
End: 2020-03-06
Attending: FAMILY MEDICINE
Payer: MEDICARE

## 2020-03-06 DIAGNOSIS — M70.61 GREATER TROCHANTERIC BURSITIS OF BOTH HIPS: ICD-10-CM

## 2020-03-06 DIAGNOSIS — M70.62 GREATER TROCHANTERIC BURSITIS OF BOTH HIPS: ICD-10-CM

## 2020-03-06 PROCEDURE — 97014 ELECTRIC STIMULATION THERAPY: CPT

## 2020-03-06 PROCEDURE — 97140 MANUAL THERAPY 1/> REGIONS: CPT

## 2020-03-06 PROCEDURE — 97110 THERAPEUTIC EXERCISES: CPT

## 2020-03-06 NOTE — OP THERAPY DAILY TREATMENT
Outpatient Physical Therapy  DAILY TREATMENT     [facility] [address], [address]  [place] [address]  Phone:  [phone]    Date: 03/06/2020    Patient: Ed Alvarez  YOB: 1945  MRN: [id number]     Time Calculation               Chief Complaint: No chief complaint on file.    Visit #: 4    SUBJECTIVE:  The patient reports having tightness and cramping to the bilateral hamstring region     OBJECTIVE:  Current objective measures:     decrease tightness to the bilateral hip and hamstring       Therapeutic Exercises (CPT 72076):     1. SLR's , 1 x10 reps     2. Sidelying ITB stretch, 3 minutes each side     3. Hip abduction, 1 x10 reps     4. Bridges with adduction/abduction , 1 x10 reps with pillow squeezes    5. Bridges over ball, 1 x10 reps     Therapeutic Treatments and Modalities:     1. Manual Therapy (CPT 20603), (R) hip , IASTM to the (R) hip for 10 minutes to gluteal and bilateral hamstring group in prone     2. E Stim Unattended (CPT 15445), (R) hip 15 minutes of IFC electrical stimulation with MHP     Time-based treatments/modalities:            ASSESSMENT:   Response to treatment:   IASTM to the bilateral hamstring and (R) gluteals with moderate pressure; tenderness present with less reorted following tissue mobilization; also addressed bilateral hamstring region; Tolerated well.      PLAN/RECOMMENDATIONS:   Plan for treatment: therapy treatment to continue next visit.  Planned interventions for next visit: continue with current treatment.

## 2020-03-09 ENCOUNTER — PHYSICAL THERAPY (OUTPATIENT)
Dept: PHYSICAL THERAPY | Facility: REHABILITATION | Age: 75
End: 2020-03-09
Attending: FAMILY MEDICINE
Payer: MEDICARE

## 2020-03-09 ENCOUNTER — TELEPHONE (OUTPATIENT)
Dept: MEDICAL GROUP | Facility: PHYSICIAN GROUP | Age: 75
End: 2020-03-09

## 2020-03-09 DIAGNOSIS — M70.61 GREATER TROCHANTERIC BURSITIS OF BOTH HIPS: ICD-10-CM

## 2020-03-09 DIAGNOSIS — M70.62 GREATER TROCHANTERIC BURSITIS OF BOTH HIPS: ICD-10-CM

## 2020-03-09 PROCEDURE — 97110 THERAPEUTIC EXERCISES: CPT

## 2020-03-09 PROCEDURE — 97140 MANUAL THERAPY 1/> REGIONS: CPT

## 2020-03-09 PROCEDURE — 97014 ELECTRIC STIMULATION THERAPY: CPT

## 2020-03-09 NOTE — TELEPHONE ENCOUNTER
ESTABLISHED PATIENT PRE-VISIT PLANNING     Patient was NOT contacted to complete PVP.      1.  Reviewed notes from the last few office visits within the medical group: Yes    2.  If any orders were placed at last visit or intended to be done for this visit (i.e. 6 mos follow-up), do we have Results/Consult Notes?        •  Labs - Labs were not ordered at last office visit.          •  Imaging - Imaging was not ordered at last office visit.       •  Referrals - Referral ordered, patient was seen and consult notes are in chart. Care Teams updated  YES.    3. Is this appointment scheduled as a Hospital Follow-Up? No    4.  Immunizations were updated in Epic using WebIZ?: Epic matches WebIZ       •  Web Iz Recommendations: FLU, TD, VARICELLA (Chicken Pox)  and SHINGRIX (Shingles)    5.  Patient is due for the following Health Maintenance Topics:   Health Maintenance Due   Topic Date Due   • Annual Wellness Visit  1945   • HEPATITIS C SCREENING  1945   • IMM ZOSTER VACCINES (2 of 3) 02/26/2011   • IMM INFLUENZA (1) 09/01/2019       - Patient plans to schedule appointment for Annual Wellness Visit (AWV).    6. Orders for overdue Health Maintenance topics pended in Pre-Charting? NO    7.  AHA (MDX) form printed for Provider? YES    8.  Patient was NOT informed to arrive 15 min prior to their scheduled appointment and bring in their medication bottles.

## 2020-03-09 NOTE — OP THERAPY DAILY TREATMENT
Outpatient Physical Therapy  DAILY TREATMENT     [facility] [place] [address], [address]  [place] [address]  Phone:  [phone]    Date: 03/09/2020    Patient: Ed Alvarez  YOB: 1945  MRN: [id number]     Time Calculation  Start time: 1330  Stop time: 1400 Time Calculation (min): 30 minutes       Chief Complaint: Hip Problem    Visit #: 5    SUBJECTIVE:  The patient reports easier time with performing his HEP with better positioning and less cramping sensations.    OBJECTIVE:  Current objective measures:     strengthening to the LE's; hip flexors, hip abductors      Therapeutic Exercises (CPT 69183):     1. SLR's , 1 x10 reps with 3#    2. Sidelying ITB stretch, 3 minutes each side     3. Hip abduction, 1 x10 reps with 3#    4. Bridges with adduction/abduction using 7# medicine ball, 1 x10 reps with pillow squeezes and using green tband for added resistance    5. Bridges without ball, 1 x10 reps     Therapeutic Treatments and Modalities:     1. Manual Therapy (CPT 65282), (R) and (L)  hip , IASTM to the (R) and (L) hip for 10 minutes to gluteal and bilateral hamstring group in prone     2. E Stim Unattended (CPT 32843), (L) hip 15 minutes of IFC electrical stimulation with MHP     Time-based treatments/modalities:  Manual therapy minutes (CPT 74218): 10 minutes  Therapeutic exercise minutes (CPT 36250): 20 minutes         ASSESSMENT:   Response to treatment:   Patient was able to use 3# ankle weights while performing hip abduction and hip flexor stretches with increased weight to a fatigue response upon hip musculature. Patient occasionally has cramping to the (L) posterior thigh during bridges but tolerates exercises.    PLAN/RECOMMENDATIONS:   Plan for treatment: therapy treatment to continue next visit.  Planned interventions for next visit: continue with current treatment.

## 2020-03-11 ENCOUNTER — APPOINTMENT (OUTPATIENT)
Dept: PHYSICAL THERAPY | Facility: REHABILITATION | Age: 75
End: 2020-03-11
Attending: FAMILY MEDICINE
Payer: MEDICARE

## 2020-03-13 NOTE — OP THERAPY DAILY TREATMENT
Outpatient Physical Therapy  DAILY TREATMENT     [facility] Outpatient Physical Therapy [place] [address] [facility], [address]  [place] [address]  Phone:  [phone]    Date: 03/16/2020    Patient: Ed Alvarez  YOB: 1945  MRN: [id number]     Time Calculation  Start time: 1330  Stop time: 1415 Time Calculation (min): 45 minutes       Chief Complaint: Hip Injury and Hip Problem    Visit #: 6    SUBJECTIVE:  The patient reports (R) hip is giving him trouble sleeping at night. He has been having pain to his (R) knee.    OBJECTIVE:  Current objective measures:     decrease tightness to hips at ITB      Therapeutic Exercises (CPT 20100):     1. SLR's , 1 x10 reps with 3#    2. Sidelying ITB stretch, 3 minutes each side     3. Hip abduction, 1 x10 reps with 3#    4. Bridges with adduction/abduction using 7# medicine ball, 1 x10 reps with pillow squeezes and using green tband for added resistance    5. Upright bike , 8 minutes; seat level 5    Therapeutic Treatments and Modalities:     1. Manual Therapy (CPT 86256), (R) and (L)  hip , IASTM to the (R) and (L) hip for 10 minutes to gluteal and bilateral hamstring group in prone     2. E Stim Unattended (CPT 29728), (L) hip 15 minutes of IFC electrical stimulation with MHP     3. Neuromuscular Re-education (CPT 52551), Hips neuromuscular control, strengthening, balance and stability to trunk     Time-based treatments/modalities:  Manual therapy minutes (CPT 31106): 10 minutes  Therapeutic exercise minutes (CPT 80137): 20 minutes  Neuromusc re-ed, balance, coor, post minutes (CPT 09962): 10 minutes       ASSESSMENT:   Response to treatment:   IASTM to the (L) and (R) gluteals/ hamstring muscle groups; continued tenderness to the (L) hip/glute initially. Patient tolerated treatment with less sensitivity and responded well. Less cramping to hamstrings during ball bridging exercises     PLAN/RECOMMENDATIONS:   Plan for treatment: therapy treatment to continue next  visit.  Planned interventions for next visit: continue with current treatment.

## 2020-03-16 ENCOUNTER — PHYSICAL THERAPY (OUTPATIENT)
Dept: PHYSICAL THERAPY | Facility: REHABILITATION | Age: 75
End: 2020-03-16
Attending: FAMILY MEDICINE
Payer: MEDICARE

## 2020-03-16 DIAGNOSIS — M70.61 GREATER TROCHANTERIC BURSITIS OF BOTH HIPS: ICD-10-CM

## 2020-03-16 DIAGNOSIS — M70.62 GREATER TROCHANTERIC BURSITIS OF BOTH HIPS: ICD-10-CM

## 2020-03-16 PROCEDURE — 97112 NEUROMUSCULAR REEDUCATION: CPT

## 2020-03-16 PROCEDURE — 97140 MANUAL THERAPY 1/> REGIONS: CPT

## 2020-03-16 PROCEDURE — 97110 THERAPEUTIC EXERCISES: CPT

## 2020-03-16 PROCEDURE — 97014 ELECTRIC STIMULATION THERAPY: CPT

## 2020-03-18 ENCOUNTER — PHYSICAL THERAPY (OUTPATIENT)
Dept: PHYSICAL THERAPY | Facility: REHABILITATION | Age: 75
End: 2020-03-18
Attending: FAMILY MEDICINE
Payer: MEDICARE

## 2020-03-18 DIAGNOSIS — M70.61 GREATER TROCHANTERIC BURSITIS OF BOTH HIPS: ICD-10-CM

## 2020-03-18 DIAGNOSIS — M70.62 GREATER TROCHANTERIC BURSITIS OF BOTH HIPS: ICD-10-CM

## 2020-03-18 PROCEDURE — 97014 ELECTRIC STIMULATION THERAPY: CPT

## 2020-03-18 NOTE — OP THERAPY DAILY TREATMENT
Outpatient Physical Therapy  DAILY TREATMENT     [facility] Outpatient Physical Therapy [place] [address] [facility], [address]  [place] [address]  Phone:  [phone]    Date: 03/18/2020    Patient: Ed Alvarez  YOB: 1945  MRN: [id number]     Time Calculation  Start time: 1500  Stop time: 1530 Time Calculation (min): 30 minutes       Chief Complaint: Hip Problem    Visit #: 7    SUBJECTIVE:  The patient reports always being sore to the (R) hip while sleeping on it at night.    OBJECTIVE:  Current objective measures:     -decrease hip tightness/tenderness; increase strengthening to the (R) hip       Therapeutic Exercises (CPT 14941):     1. SLR's , 1 x10 reps with 3#    2. Sidelying ITB stretch, 3 minutes each side     3. Standing hip abduction, 1 x10 reps with 3#    4. Bridges with adduction/abduction using 7# medicine ball, 1 x10 reps with pillow squeezes and using green tband for added resistance    5. Upright bike , 6 minutes; seat level 5    6. Bridges with clamshells, 2 x10 reps with green tb    7. Wall squats , 1 x15 reps, increased fatigue response     Therapeutic Treatments and Modalities:     1. Manual Therapy (CPT 57911), (R) and (L)  hip , IASTM to the (R) and (L) hip for 10 minutes to gluteal and bilateral hamstring group in prone     2. E Stim Unattended (CPT 54083), (L) hip 15 minutes of IFC electrical stimulation with MHP     3. Neuromuscular Re-education (CPT 45059), Hips neuromuscular control, strengthening, balance and stability to trunk     Time-based treatments/modalities:  Manual therapy minutes (CPT 84011): 10 minutes  Therapeutic exercise minutes (CPT 95578): 20 minutes  Neuromusc re-ed, balance, coor, post minutes (CPT 36462): 10 minutes         ASSESSMENT:   Response to treatment:   IASTM to the (R) hip ITB, glute medius; patient had tenderness upon moderate pressure but reported less symptoms in the hip following treatment. Instruction on wall squats using ball; tolerated 1/4 to  1/2 squats before (R) knee pain and fatigue response caused him to discontinue exercise. Advised patient to reset postural positioning so that feet are beyond knees to decrease force at knee joint.    PLAN/RECOMMENDATIONS:   Plan for treatment: therapy treatment to continue next visit.  Planned interventions for next visit: continue with current treatment.

## 2020-03-23 ENCOUNTER — OFFICE VISIT (OUTPATIENT)
Dept: MEDICAL GROUP | Facility: PHYSICIAN GROUP | Age: 75
End: 2020-03-23
Payer: MEDICARE

## 2020-03-23 VITALS
HEIGHT: 70 IN | TEMPERATURE: 97.9 F | SYSTOLIC BLOOD PRESSURE: 120 MMHG | BODY MASS INDEX: 25.8 KG/M2 | DIASTOLIC BLOOD PRESSURE: 74 MMHG | WEIGHT: 180.2 LBS | RESPIRATION RATE: 16 BRPM | HEART RATE: 82 BPM | OXYGEN SATURATION: 97 %

## 2020-03-23 DIAGNOSIS — M25.551 PAIN OF BOTH HIP JOINTS: Primary | ICD-10-CM

## 2020-03-23 DIAGNOSIS — G89.29 CHRONIC PAIN OF LEFT KNEE: ICD-10-CM

## 2020-03-23 DIAGNOSIS — M25.552 PAIN OF BOTH HIP JOINTS: Primary | ICD-10-CM

## 2020-03-23 DIAGNOSIS — M25.562 CHRONIC PAIN OF LEFT KNEE: ICD-10-CM

## 2020-03-23 DIAGNOSIS — L98.9 BUMPS ON SKIN: ICD-10-CM

## 2020-03-23 PROCEDURE — 99214 OFFICE O/P EST MOD 30 MIN: CPT | Performed by: FAMILY MEDICINE

## 2020-03-23 ASSESSMENT — PATIENT HEALTH QUESTIONNAIRE - PHQ9: CLINICAL INTERPRETATION OF PHQ2 SCORE: 0

## 2020-03-23 NOTE — ASSESSMENT & PLAN NOTE
Location: left knee (lateral, up thigh)  Onset: 2014  Duration: intermittent  Quality of Pain: pressure  Mechanical Symptoms: no locking, no catching, +buckling   Systemic Symptoms: +creaking in knee, no fevers/chills  Swelling: yes - [facility]  Precipitating trauma? 2014 - fell down stairs in [place]  Pertinent previous medical/surgical procedures: none

## 2020-03-23 NOTE — PROGRESS NOTES
Subjective:     CC: f/u hip pain    HPI:   Ed presents today with     Pain of both hip joints  This is an acute condition.  But a month ago he had had 2 to 3 months of bilateral, lateral hip pain.  I suspect greater trochanteric bursitis and referred him to physical therapy.  He reports that the pain is completely resolved with physical therapy. He has been graduated from PT and continues to do home exercises. He can now sleep on both sides but feels a little bit still in his right hip. It is much better than it was. Electrical therapy was very helpful.     Chronic pain of left knee  Location: left knee (lateral, up thigh)  Onset:   Duration: intermittent  Quality of Pain: pressure  Mechanical Symptoms: no locking, no catching, +buckling   Systemic Symptoms: +creaking in knee, no fevers/chills  Swelling: yes - lots of fluid [facility] by [facility]  Precipitating trauma?  - fell down stairs in [place]  Pertinent previous medical/surgical procedures: none        Past Medical History:   Diagnosis Date   • Hyperlipidemia        Social History     Tobacco Use   • Smoking status: Former Smoker     Packs/day: 0.15     Years: 50.00     Pack years: 7.50     Last attempt to quit:      Years since quittin.2   • Smokeless tobacco: Never Used   Substance Use Topics   • Alcohol use: Yes     Comment: occasional    • Drug use: No       Current Outpatient Medications Ordered in Epic   Medication Sig Dispense Refill   • atorvastatin (LIPITOR) 40 MG Tab Take 40 mg by mouth every day.     • Cyanocobalamin (VITAMIN B12 PO) Take  by mouth.     • Cholecalciferol (VITAMIN D-3 PO) Take  by mouth.       No current Epic-ordered facility-administered medications on file.        Allergies:  Patient has no known allergies.    Health Maintenance: Completed    ROS:  Gen: no fevers/chills  Pulm: no sob  CV: no chest pain    Objective:     Exam:  /74 (BP Location: Right arm, Patient Position: Sitting, BP Cuff Size: Adult)    "Pulse 82   Temp 36.6 °C (97.9 °F) (Temporal)   Resp 16   Ht 1.778 m (5' 10\")   Wt 81.7 kg (180 lb 3.2 oz)   SpO2 97%   BMI 25.86 kg/m²  Body mass index is 25.86 kg/m².    Gen: Alert and oriented, No apparent distress.  Neck: Neck is supple without lymphadenopathy.  Lungs: Normal effort, CTA bilaterally, no wheezes, rhonchi, or rales  CV: Regular rate and rhythm. No murmurs, rubs, or gallops.  Ext: No clubbing, cyanosis, edema.  L Knee: Full ROM, full strength, TTP not present, edema not present, varus/valgus stress negative, anterior/posterior drawer negative, Lachman's negative.    Assessment & Plan:     74 y.o. male with the following -     1. Pain of both hip joints  This is an acute condition, improved.  His last appointment I suspected bilateral greater trochanteric bursitis and has been seeing physical therapy.  Today he reports a significant improvement in his symptoms.  He states he can now sleep on his side again.  He still get some intermittent pain on the right side but is continue to do his home physical therapy exercises.  Therefore, we can hold off on an injection for now and give him more time with the home physical therapy exercises.    2. Chronic pain of left knee  This is a chronic condition, new to me.  He states in 2014 he fell down some stairs injuring his left knee pretty significantly while in [place].  When he came back he [facility] who removed \"a lot of fluid\" from the knee.  Ever since then he has had intermittent pressure and pain in the left knee with some buckling and creaking.  No other previous procedures or surgeries.  His knee exam is benign and I cannot elicit the pain on exam today.  - REFERRAL TO ORTHOPEDICS    3. Bumps on skin  This is a new condition.  He states that when he gets a cold he used to get for 5 bumps along the medial aspect X finger.  The elbow now feels lumps around his scalp.  When the wound resolves the bumps will resolve within a couple of weeks.  " They are nonpainful or itchy bumps.  Bumps are not present right now.  I am not certain what are causing these bumps but they could be viral almost like cold sores that show up during an illness.    Return in about 6 months (around 9/23/2020) for Medicare annual/wellness visit.    Please note that this dictation was created using voice recognition software. I have made every reasonable attempt to correct obvious errors, but I expect that there are errors of grammar and possibly content that I did not discover before finalizing the note.

## 2020-03-23 NOTE — ASSESSMENT & PLAN NOTE
This is an acute condition.  But a month ago he had had 2 to 3 months of bilateral, lateral hip pain.  I suspect greater trochanteric bursitis and referred him to physical therapy.  He reports that the pain is completely resolved with physical therapy. He has been graduated from PT and continues to do home exercises. He can now sleep on both sides but feels a little bit still in his right hip. It is much better than it was. Electrical therapy was very helpful.

## 2020-04-16 ENCOUNTER — PATIENT OUTREACH (OUTPATIENT)
Dept: HEALTH INFORMATION MANAGEMENT | Facility: OTHER | Age: 75
End: 2020-04-16

## 2020-04-16 NOTE — PROGRESS NOTES
Outcome: Left Message- SCP PA     Please transfer to Patient Outreach Team at [phone] when patient returns call.    HealthConnect Verified: yes    Attempt # 1

## 2020-04-20 ENCOUNTER — TELEPHONE (OUTPATIENT)
Dept: HEALTH INFORMATION MANAGEMENT | Facility: OTHER | Age: 75
End: 2020-04-20

## 2020-04-20 ENCOUNTER — OFFICE VISIT (OUTPATIENT)
Dept: MEDICAL GROUP | Facility: PHYSICIAN GROUP | Age: 75
End: 2020-04-20
Payer: MEDICARE

## 2020-04-20 VITALS
HEIGHT: 70 IN | HEART RATE: 88 BPM | BODY MASS INDEX: 26 KG/M2 | DIASTOLIC BLOOD PRESSURE: 88 MMHG | OXYGEN SATURATION: 98 % | TEMPERATURE: 97.4 F | WEIGHT: 181.6 LBS | SYSTOLIC BLOOD PRESSURE: 120 MMHG

## 2020-04-20 DIAGNOSIS — H92.01 RIGHT EAR PAIN: ICD-10-CM

## 2020-04-20 PROCEDURE — 99214 OFFICE O/P EST MOD 30 MIN: CPT | Performed by: FAMILY MEDICINE

## 2020-04-20 RX ORDER — PSEUDOEPHEDRINE HCL 120 MG/1
120 TABLET, FILM COATED, EXTENDED RELEASE ORAL 2 TIMES DAILY PRN
Qty: 60 TAB | Refills: 6 | Status: SHIPPED
Start: 2020-04-20 | End: 2020-09-23

## 2020-04-20 RX ORDER — AMOXICILLIN AND CLAVULANATE POTASSIUM 875; 125 MG/1; MG/1
1 TABLET, FILM COATED ORAL 2 TIMES DAILY
Qty: 14 TAB | Refills: 0 | Status: SHIPPED | OUTPATIENT
Start: 2020-04-20 | End: 2020-04-27

## 2020-04-20 ASSESSMENT — PAIN SCALES - GENERAL: PAINLEVEL: 8=MODERATE-SEVERE PAIN

## 2020-04-20 NOTE — TELEPHONE ENCOUNTER
1. Caller Name: Ed Alvarez                      Call Back Number: [phone] (home)     [facility] PCP or Specialty Provider: Yes Sarika        2.  Does patient have any active symptoms of respiratory illness (fever OR cough OR shortness of breath OR sore throat)? No.     C/O ear pain, oset yesterday.  Unable to sleep r/t pain.    3.  Does patient have any comoribidities? None     4.  Has the patient traveled in the last 14 days OR had any known contact with someone who is suspected or confirmed to have COVID-19?  No.    5. Disposition: Cleared by RN Triage; OK to keep/schedule appointment    Note routed to [facility] Provider: MELISSA only.   
stated/actual

## 2020-04-20 NOTE — ASSESSMENT & PLAN NOTE
"This is a new condition.    Symptom onset: Started having right ear pain since last night  Current symptoms: He describes ongoing right ear discomfort with pressure but suddenly gets a \"jolt of pain\" every once in a while. Also feels some pressure above his right ear in the parietal region. Denies headache.   Since onset symptoms are: Unchanged  Modifying factors: He does not clean his ears regularly. Uses Qtip on occasion. Last time about 1 month ago. The patient denies any TMJ pain.   Treatments tried: Has not tried anything for relief.   Associated symptoms: Denies any vision changes. Denies any temporal region pain. Denies any hearing loss.     "

## 2020-04-20 NOTE — PATIENT INSTRUCTIONS
Take antibiotic for 7 days - twice per day  Take Sudafed for 2-3 days twice per day and then as needed  If not better by Wednesday, call our office for a visit once again

## 2020-04-20 NOTE — PROGRESS NOTES
"Subjective:     Chief Complaint   Patient presents with   • Otalgia     (R) x 1 day, radiates to side & back of head, causing dizziness off & on        HPI:   Ed presents today to discuss the following.  PCP: Dr. Fine    Right ear pain  This is a new condition.    Symptom onset: Started having right ear pain since last night  Current symptoms: He describes ongoing right ear discomfort with pressure but suddenly gets a \"jolt of pain\" every once in a while. Also feels some pressure above his right ear in the parietal region. Denies headache.   Since onset symptoms are: Unchanged  Modifying factors: He does not clean his ears regularly. Uses Qtip on occasion. Last time about 1 month ago. The patient denies any TMJ pain.   Treatments tried: Has not tried anything for relief.   Associated symptoms: Denies any vision changes. Denies any temporal region pain. Denies any hearing loss.         Past Medical History:   Diagnosis Date   • Hyperlipidemia        Social History     Tobacco Use   • Smoking status: Former Smoker     Packs/day: 0.15     Years: 50.00     Pack years: 7.50     Last attempt to quit:      Years since quittin.3   • Smokeless tobacco: Never Used   Substance Use Topics   • Alcohol use: Yes     Comment: occasional    • Drug use: No       Current Outpatient Medications Ordered in Epic   Medication Sig Dispense Refill   • amoxicillin-clavulanate (AUGMENTIN) 875-125 MG Tab Take 1 Tab by mouth 2 times a day for 7 days. 14 Tab 0   • pseudoephedrine SR (SUDAFED SR) 120 MG TABLET SR 12 HR Take 1 Tab by mouth 2 times a day as needed for Congestion. 60 Tab 6   • atorvastatin (LIPITOR) 40 MG Tab Take 40 mg by mouth every day.     • Cyanocobalamin (VITAMIN B12 PO) Take  by mouth.     • Cholecalciferol (VITAMIN D-3 PO) Take  by mouth.       No current Epic-ordered facility-administered medications on file.        Allergies:  Patient has no known allergies.    Health Maintenance: Completed    ROS:  Gen: no " "fevers/chills, no changes in weight  Eyes: no changes in vision  ENT: no sore throat, no hearing loss, no bloody nose  Pulm: no sob, no cough  CV: no chest pain, no palpitations      Objective:     Exam:  /88 (BP Location: Right arm, Patient Position: Sitting, BP Cuff Size: Adult)   Pulse 88   Temp 36.3 °C (97.4 °F) (Temporal)   Ht 1.778 m (5' 10\")   Wt 82.4 kg (181 lb 9.6 oz)   SpO2 98%   BMI 26.06 kg/m²  Body mass index is 26.06 kg/m².    Constitutional: Alert, no distress, well-groomed.  Skin: Warm, dry, good turgor, no rashes in visible areas.  Eye: Equal, round and reactive, conjunctiva clear, lids normal.  ENMT: Inspection of his right ear shows a cloudy tympanic membrane with surrounding erythema.  Left ear appears normal.  There is no ear canal narrowing or drainage.  Lips without lesions, good dentition, moist mucous membranes.   Neck: Trachea midline, no masses, no thyromegaly.  Respiratory: Unlabored respiratory effort, no cough.  MSK: Normal gait, moves all extremities.  Neuro: Grossly non-focal.   Psych: Alert and oriented x3, normal affect and mood.        Assessment & Plan:     74 y.o. male with the following -     1. Right ear pain  This is a new problem.  The patient presents with right otalgia of 1 day duration with a pressure sensation.  Physical exam is suggestive of otitis media.  At this time I would like to prescribe Augmentin and also do a trial of Sudafed to help with his pressure sensation.  I provided the patient with instructions and to notify us if he is not better by Wednesday.  Return precautions given.  I recommended against Q-tip use.  The patient is agreeable to plan.  - amoxicillin-clavulanate (AUGMENTIN) 875-125 MG Tab; Take 1 Tab by mouth 2 times a day for 7 days.  Dispense: 14 Tab; Refill: 0  - pseudoephedrine SR (SUDAFED SR) 120 MG TABLET SR 12 HR; Take 1 Tab by mouth 2 times a day as needed for Congestion.  Dispense: 60 Tab; Refill: 6      Return if symptoms worsen " or fail to improve.    Please note that this dictation was created using voice recognition software. I have made every reasonable attempt to correct obvious errors, but I expect that there are errors of grammar and possibly content that I did not discover before finalizing the note.

## 2020-06-19 ENCOUNTER — OFFICE VISIT (OUTPATIENT)
Dept: MEDICAL GROUP | Facility: PHYSICIAN GROUP | Age: 75
End: 2020-06-19
Payer: MEDICARE

## 2020-06-19 VITALS
TEMPERATURE: 98.5 F | RESPIRATION RATE: 16 BRPM | HEART RATE: 88 BPM | WEIGHT: 178 LBS | SYSTOLIC BLOOD PRESSURE: 110 MMHG | DIASTOLIC BLOOD PRESSURE: 70 MMHG | BODY MASS INDEX: 25.48 KG/M2 | HEIGHT: 70 IN | OXYGEN SATURATION: 96 %

## 2020-06-19 DIAGNOSIS — R19.09 LUMP IN THE GROIN: Primary | ICD-10-CM

## 2020-06-19 DIAGNOSIS — M25.551 PAIN OF BOTH HIP JOINTS: ICD-10-CM

## 2020-06-19 DIAGNOSIS — M25.552 PAIN OF BOTH HIP JOINTS: ICD-10-CM

## 2020-06-19 PROCEDURE — 99214 OFFICE O/P EST MOD 30 MIN: CPT | Performed by: FAMILY MEDICINE

## 2020-06-19 NOTE — ASSESSMENT & PLAN NOTE
This is a new condition.  Onset: 4 days ago  Location: right groin  Duration: intermittent  Quality: lump  Modifying factors: bulge with standing  Associated symptoms: no pain, no constipation, no diarrhea, no nausea/vomiting, no fevers/chills

## 2020-06-19 NOTE — ASSESSMENT & PLAN NOTE
This is an acute condition. He has lateral hip pain and the right side has been intermittently bothersome. He has done PT but it has been only somewhat helpful. I suspected greater trochanter bursitis and he is interested in an injection today.

## 2020-06-19 NOTE — PROCEDURES
PROCEDURE NOTE:  Discuss risk and benefit of right greater trochanteric bursa injection. Patient agreed to procedure. Skin was prepped and draped in usual fashion. 1 cc of 40 Kenalog and 1 cc of 1% lidocaine without epinephrine were injected with a 25 g needle into the point of maximum tenderness, and distributed in a fanlike projection. Skin was then cleaned with alcohol swabs and bandage applied. Patient tolerated procedure well. Wound care and return precaution instructions given.

## 2020-06-19 NOTE — PROGRESS NOTES
"Subjective:     CC: lump    HPI:   Ed presents today with     Lump in the groin  This is a new condition.  Onset: 4 days ago  Location: right groin  Duration: intermittent  Quality: lump  Modifying factors: bulge with standing  Associated symptoms: no pain, no constipation, no diarrhea, no nausea/vomiting, no fevers/chills    Pain of both hip joints  This is an acute condition. He has lateral hip pain and the right side has been intermittently bothersome. He has done PT but it has been only somewhat helpful. I suspected greater trochanter bursitis and he is interested in an injection today.       Past Medical History:   Diagnosis Date   • Hyperlipidemia        Social History     Tobacco Use   • Smoking status: Former Smoker     Packs/day: 0.15     Years: 50.00     Pack years: 7.50     Last attempt to quit:      Years since quittin.4   • Smokeless tobacco: Never Used   Substance Use Topics   • Alcohol use: Yes     Comment: occasional    • Drug use: No       Current Outpatient Medications Ordered in Epic   Medication Sig Dispense Refill   • pseudoephedrine SR (SUDAFED SR) 120 MG TABLET SR 12 HR Take 1 Tab by mouth 2 times a day as needed for Congestion. 60 Tab 6   • atorvastatin (LIPITOR) 40 MG Tab Take 40 mg by mouth every day.     • Cyanocobalamin (VITAMIN B12 PO) Take  by mouth.     • Cholecalciferol (VITAMIN D-3 PO) Take  by mouth.       No current Epic-ordered facility-administered medications on file.        Allergies:  Patient has no known allergies.    Health Maintenance: Completed    ROS:  Gen: no fevers/chills  Pulm: no sob  CV: no chest pain    Objective:     Exam:  /70 (BP Location: Left arm, Patient Position: Sitting, BP Cuff Size: Adult)   Pulse 88   Temp 36.9 °C (98.5 °F) (Temporal)   Resp 16   Ht 1.778 m (5' 10\")   Wt 80.7 kg (178 lb)   SpO2 96%   BMI 25.54 kg/m²  Body mass index is 25.54 kg/m².    Gen: Alert and oriented, No apparent distress.  Neck: Neck is supple without " lymphadenopathy.  Lungs: Normal effort, CTA bilaterally, no wheezes, rhonchi, or rales  CV: Regular rate and rhythm. No murmurs, rubs, or gallops.  : Lump palpable in right groin that bulges with valsalva.   Ext: No clubbing, cyanosis, edema.    Assessment & Plan:     74 y.o. male with the following -     1. Lump in the groin  This is a new condition.  For last 4 days he is had a lump in the groin that is intermittent.  He does note that it bulges more when he stands and goes away on its own.  There is no associated pain or changes in bowel habits.  On exam he does have a palpable lump that does bulge a little bit more with Valsalva so I suspect he does have a hernia.  We will get an inguinal hernia study to verify and if there is 1 present on ultrasound we will place a referral to general surgery as he is interested in discussing it further with the surgeon.  - US-INGUINAL HERNIA; Future    2. Pain of both hip joints  This is an acute condition, unchanged.  For several months now he has had pain in the lateral hips bilaterally.  He is in physical therapy and only found minimal improvement.  He is interested in an injection today but when he points to the location of the pain, it is not is not the greater trochanter bursa and therefore I would not provide an injection as I would not want to accidentally hit his sciatic nerve.  He expressed understanding today.    Return if symptoms worsen or fail to improve.    Please note that this dictation was created using voice recognition software. I have made every reasonable attempt to correct obvious errors, but I expect that there are errors of grammar and possibly content that I did not discover before finalizing the note.

## 2020-06-29 ENCOUNTER — TELEPHONE (OUTPATIENT)
Dept: MEDICAL GROUP | Facility: PHYSICIAN GROUP | Age: 75
End: 2020-06-29

## 2020-06-29 ENCOUNTER — HOSPITAL ENCOUNTER (OUTPATIENT)
Dept: RADIOLOGY | Facility: MEDICAL CENTER | Age: 75
End: 2020-06-29
Attending: FAMILY MEDICINE
Payer: MEDICARE

## 2020-06-29 DIAGNOSIS — K40.90 INDIRECT INGUINAL HERNIA: ICD-10-CM

## 2020-06-29 DIAGNOSIS — R19.09 LUMP IN THE GROIN: ICD-10-CM

## 2020-06-29 PROCEDURE — 76857 US EXAM PELVIC LIMITED: CPT

## 2020-06-29 NOTE — PROGRESS NOTES
Recent ultrasound did show indirect inguinal hernia on the right side.  Referral to general surgery has been placed.

## 2020-06-30 NOTE — TELEPHONE ENCOUNTER
----- Message from Lo Fine M.D. sent at 6/29/2020  4:50 PM PDT -----  You do have a hernia on the right side.  I have placed a referral to general surgery to discuss options.

## 2020-06-30 NOTE — TELEPHONE ENCOUNTER
Phone Number Called: [phone] (home)     Call outcome: Spoke to patient regarding message below.    Message: understood results, no questions

## 2020-07-30 NOTE — PROGRESS NOTES
1. HealthConnect Verified: yes    2. Verify PCP: yes    3. Review and add  to Care Team: yes      4. Reviewed/Updated the following with patient:       •   Communication Preference Obtained? YES  • MyChart Activation: declined       •   E-Mail Address Obtained? NO       •   Appointment Day and Time Preferences? YES       •   Preferred Pharmacy? YES       •   Preferred Lab? YES    5. Care Gap Scheduling (Attempt to Schedule EACH Overdue Care Gap!)    Scheduled patient for Annual Wellness Visit

## 2020-08-20 ENCOUNTER — OFFICE VISIT (OUTPATIENT)
Dept: MEDICAL GROUP | Facility: PHYSICIAN GROUP | Age: 75
End: 2020-08-20
Payer: MEDICARE

## 2020-08-20 VITALS
HEIGHT: 70 IN | BODY MASS INDEX: 24.85 KG/M2 | OXYGEN SATURATION: 99 % | DIASTOLIC BLOOD PRESSURE: 70 MMHG | RESPIRATION RATE: 16 BRPM | SYSTOLIC BLOOD PRESSURE: 100 MMHG | WEIGHT: 173.6 LBS | HEART RATE: 67 BPM | TEMPERATURE: 97.2 F

## 2020-08-20 DIAGNOSIS — Z02.89 ENCOUNTER FOR COMPLETION OF FORM WITH PATIENT: ICD-10-CM

## 2020-08-20 PROCEDURE — 7101 PR PHYSICAL: Performed by: FAMILY MEDICINE

## 2020-08-20 NOTE — PROGRESS NOTES
"Subjective:     CC: DMV form    HPI:   Ed presents today with a form for the DMV.  He brought in a form from the DMV since he is over 72 years old to continue driving.  He has no other acute concerns today.    Past Medical History:   Diagnosis Date   • Hyperlipidemia        Social History     Tobacco Use   • Smoking status: Former Smoker     Packs/day: 0.15     Years: 50.00     Pack years: 7.50     Quit date:      Years since quittin.6   • Smokeless tobacco: Never Used   Substance Use Topics   • Alcohol use: Yes     Comment: occasional    • Drug use: No       Current Outpatient Medications Ordered in Epic   Medication Sig Dispense Refill   • pseudoephedrine SR (SUDAFED SR) 120 MG TABLET SR 12 HR Take 1 Tab by mouth 2 times a day as needed for Congestion. 60 Tab 6   • atorvastatin (LIPITOR) 40 MG Tab Take 40 mg by mouth every day.     • Cyanocobalamin (VITAMIN B12 PO) Take  by mouth.     • Cholecalciferol (VITAMIN D-3 PO) Take  by mouth.       No current Epic-ordered facility-administered medications on file.        Allergies:  Patient has no known allergies.    Health Maintenance: deferred for annual    ROS:  Gen: no fevers/chills  Pulm: no sob  CV: no chest pain    Objective:     Exam:  /70   Pulse 67   Temp 36.2 °C (97.2 °F)   Resp 16   Ht 1.778 m (5' 10\")   Wt 78.7 kg (173 lb 9.6 oz)   SpO2 99%   BMI 24.91 kg/m²  Body mass index is 24.91 kg/m².    Constitutional: Alert, no distress, well-groomed.  Skin: Warm, dry, good turgor, no rashes in visible areas.  Eye: Equal, round and reactive, conjunctiva clear, lids normal.  ENMT: Lips without lesions, good dentition, moist mucous membranes.  Neck: Trachea midline, no masses, no thyromegaly.  Respiratory: Unlabored respiratory effort, no cough.  MSK: Normal gait, moves all extremities.  Neuro: Grossly non-focal.   Psych: Alert and oriented x3, normal affect and mood.    Assessment & Plan:     75 y.o. male with the following -     1. Encounter " for completion of form with patient  He has a DMV form is to be filled out since he is over 70 years old and has a 's license.  He has no medical conditions or medications that would affect his ability to drive.  The form was filled out, scanned into the chart, and provided back to the patient prior to discharge.    Return if symptoms worsen or fail to improve.    Please note that this dictation was created using voice recognition software. I have made every reasonable attempt to correct obvious errors, but I expect that there are errors of grammar and possibly content that I did not discover before finalizing the note.

## 2020-09-15 ENCOUNTER — TELEPHONE (OUTPATIENT)
Dept: MEDICAL GROUP | Facility: PHYSICIAN GROUP | Age: 75
End: 2020-09-15

## 2020-09-15 NOTE — TELEPHONE ENCOUNTER
ESTABLISHED PATIENT PRE-VISIT PLANNING     Patient was NOT contacted to complete PVP.     Note: Patient will not be contacted if there is no indication to call.     1.  Reviewed notes from the last few office visits within the medical group: Yes    2.  If any orders were placed at last visit or intended to be done for this visit (i.e. 6 mos follow-up), do we have Results/Consult Notes?        •  Labs - Labs were not ordered at last office visit.   Note: If patient appointment is for lab review and patient did not complete labs, check with provider if OK to reschedule patient until labs completed.       •  Imaging - Imaging was not ordered at last office visit.       •  Referrals - No referrals were ordered at last office visit.    3. Is this appointment scheduled as a Hospital Follow-Up? No    4.  Immunizations were updated in Epic using WebIZ?: Epic matches WebIZ       •  Web Iz Recommendations: FLU and SHINGRIX (Shingles)    5.  Patient is due for the following Health Maintenance Topics:   Health Maintenance Due   Topic Date Due   • Annual Wellness Visit  1945   • HEPATITIS C SCREENING  1945   • IMM ZOSTER VACCINES (2 of 3) 02/26/2011   • IMM INFLUENZA (1) 09/01/2020       - Patient already has appointment scheduled for Annual Wellness Visit (AWV).    6. Orders for overdue Health Maintenance topics pended in Pre-Charting? N\A    7.  AHA (MDX) form printed for Provider? YES    8.  Patient was NOT informed to arrive 15 min prior to their scheduled appointment and bring in their medication bottles.

## 2020-09-23 ENCOUNTER — OFFICE VISIT (OUTPATIENT)
Dept: MEDICAL GROUP | Facility: PHYSICIAN GROUP | Age: 75
End: 2020-09-23
Payer: MEDICARE

## 2020-09-23 VITALS
RESPIRATION RATE: 14 BRPM | SYSTOLIC BLOOD PRESSURE: 110 MMHG | HEIGHT: 70 IN | TEMPERATURE: 98.2 F | WEIGHT: 174 LBS | BODY MASS INDEX: 24.91 KG/M2 | HEART RATE: 86 BPM | OXYGEN SATURATION: 93 % | DIASTOLIC BLOOD PRESSURE: 78 MMHG

## 2020-09-23 DIAGNOSIS — M70.61 GREATER TROCHANTERIC BURSITIS OF RIGHT HIP: ICD-10-CM

## 2020-09-23 DIAGNOSIS — Z00.00 ENCOUNTER FOR MEDICARE ANNUAL WELLNESS EXAM: ICD-10-CM

## 2020-09-23 DIAGNOSIS — G31.84 MILD COGNITIVE IMPAIRMENT: ICD-10-CM

## 2020-09-23 DIAGNOSIS — G89.29 CHRONIC PAIN OF LEFT KNEE: ICD-10-CM

## 2020-09-23 DIAGNOSIS — M25.562 CHRONIC PAIN OF LEFT KNEE: ICD-10-CM

## 2020-09-23 PROBLEM — H92.01 RIGHT EAR PAIN: Status: RESOLVED | Noted: 2020-04-20 | Resolved: 2020-09-23

## 2020-09-23 PROBLEM — R19.09 LUMP IN THE GROIN: Status: RESOLVED | Noted: 2020-06-19 | Resolved: 2020-09-23

## 2020-09-23 PROCEDURE — G0439 PPPS, SUBSEQ VISIT: HCPCS | Performed by: FAMILY MEDICINE

## 2020-09-23 PROCEDURE — 8041 PR SCP AHA: Performed by: FAMILY MEDICINE

## 2020-09-23 SDOH — HEALTH STABILITY: MENTAL HEALTH: HOW OFTEN DO YOU HAVE A DRINK CONTAINING ALCOHOL?: MONTHLY OR LESS

## 2020-09-23 SDOH — HEALTH STABILITY: MENTAL HEALTH: HOW MANY STANDARD DRINKS CONTAINING ALCOHOL DO YOU HAVE ON A TYPICAL DAY?: 1 OR 2

## 2020-09-23 ASSESSMENT — ACTIVITIES OF DAILY LIVING (ADL): BATHING_REQUIRES_ASSISTANCE: 0

## 2020-09-23 ASSESSMENT — PATIENT HEALTH QUESTIONNAIRE - PHQ9: CLINICAL INTERPRETATION OF PHQ2 SCORE: 0

## 2020-09-23 ASSESSMENT — ENCOUNTER SYMPTOMS: GENERAL WELL-BEING: GOOD

## 2020-09-23 NOTE — PROGRESS NOTES
Chief Complaint   Patient presents with   • Annual Wellness Visit         HPI:  Ed Alvarez is a 75 y.o. here for Medicare Annual Wellness Visit     Patient Active Problem List    Diagnosis Date Noted   • Chronic pain of left knee 03/23/2020   • Greater trochanteric bursitis of right hip 02/06/2020   • Pain 05/22/2019       Current Outpatient Medications   Medication Sig Dispense Refill   • atorvastatin (LIPITOR) 40 MG Tab Take 40 mg by mouth every day.     • Cyanocobalamin (VITAMIN B12 PO) Take  by mouth.     • Cholecalciferol (VITAMIN D-3 PO) Take  by mouth.       No current facility-administered medications for this visit.             Current supplements as per medication list.       Allergies: Patient has no known allergies.    Current social contact/activities: Talks to his neighbors a lot/ [place]      He  reports that he quit smoking about 13 years ago. He has a 7.50 pack-year smoking history. He has never used smokeless tobacco. He reports current alcohol use. He reports that he does not use drugs.  Counseling given: Yes      DPA/Advanced Directive:  Patient has Advanced Directive, but it is not on file. Instructed to bring in a copy to scan into their chart.    ROS:    Gait: Uses no assistive device  Ostomy: No  Other tubes: No  Amputations: No  Chronic oxygen use: No  Last eye exam: 05/2020  Wears hearing aids: No   : Denies any urinary leakage during the last 6 months    Screening:  Annual Exam   Depression Screening    Little interest or pleasure in doing things?  0 - not at all  Feeling down, depressed , or hopeless? 0 - not at all  Patient Health Questionnaire Score: 0     If depressive symptoms identified deferred to follow up visit unless specifically addressed in assessment and plan.    Interpretation of PHQ-9 Total Score   Score Severity   1-4 No Depression   5-9 Mild Depression   10-14 Moderate Depression   15-19 Moderately Severe Depression   20-27 Severe  Depression    Screening for Cognitive Impairment    Three Minute Recall (river, nation, pradeep) 1/3 Ed   Farhat clock face with all 12 numbers and set the hands to show 10 past 11.  Yes    Cognitive concerns identified deferred for follow up unless specifically addressed in assessment and plan.    Fall Risk Assessment    Has the patient had two or more falls in the last year or any fall with injury in the last year?  No    Safety Assessment    Throw rugs on floor.  Yes  Handrails on all stairs.  Yes  Good lighting in all hallways.  Yes  Difficulty hearing.  No  Patient counseled about all safety risks that were identified.    Functional Assessment ADLs    Are there any barriers preventing you from cooking for yourself or meeting nutritional needs?  No.    Are there any barriers preventing you from driving safely or obtaining transportation?  No.    Are there any barriers preventing you from using a telephone or calling for help?  No.    Are there any barriers preventing you from shopping?  No.    Are there any barriers preventing you from taking care of your own finances?  No.    Are there any barriers preventing you from managing your medications?  No.    Are there any barriers preventing you from showering, bathing or dressing yourself?  No.    Are you currently engaging in any exercise or physical activity?  Yes.  Walk and Swim   What is your perception of your health?  Good.      Health Maintenance Summary                Annual Wellness Visit Overdue 1945     IMM INFLUENZA Overdue 9/1/2020      Done 11/16/2018 Imm Admin: Influenza Vaccine Adult HD     Patient has more history with this topic...    IMM ZOSTER VACCINES Postponed 2/15/2021 Originally 2/26/2011. System: vaccine not available, other system reasons     Done 1/1/2011 Imm Admin: Zoster Vaccine Live (ZVL) (Zostavax) - HISTORICAL DATA    HEPATITIS C SCREENING Postponed 9/15/2021 Originally 1945. Patient Refused    IMM DTaP/Tdap/Td Vaccine Next  "Due 2021      Done 2011 Imm Admin: Tdap Vaccine    COLONOSCOPY Next Due 2025      Done 2015 REFERRAL TO GI FOR COLONOSCOPY          Patient Care Team:  Lo Fine M.D. as PCP - General (Family Medicine)  Troy Gibbs Ass[facility] as [facility]   Jf Salazar, PT as Physical Therapy (Physical Therapy)  Deshawn Reed M.D. as Consulting Physician (Surgery)        Social History     Tobacco Use   • Smoking status: Former Smoker     Packs/day: 0.15     Years: 50.00     Pack years: 7.50     Quit date:      Years since quittin.7   • Smokeless tobacco: Never Used   Substance Use Topics   • Alcohol use: Yes     Frequency: Monthly or less     Drinks per session: 1 or 2     Comment: occasional    • Drug use: No     Family History   Problem Relation Age of Onset   • Cancer Mother         lung (smoker)   • No Known Problems Father    • Dementia Brother         Lewy Body   • Diabetes Neg Hx    • Heart Disease Neg Hx    • Stroke Neg Hx      He  has a past medical history of Hyperlipidemia.   Past Surgical History:   Procedure Laterality Date   • INGUINAL HERNIA REPAIR Right    • LAMINOTOMY     • TONSILLECTOMY         Exam:   /78 (BP Location: Left arm, Patient Position: Sitting, BP Cuff Size: Adult)   Pulse 86   Temp 36.8 °C (98.2 °F) (Temporal)   Resp 14   Ht 1.778 m (5' 10\")   Wt 78.9 kg (174 lb)   SpO2 93%  Body mass index is 24.97 kg/m².    Hearing good.    Dentition good  Alert, oriented in no acute distress.  Eye contact is good, speech goal directed, affect calm    Assessment and Plan. The following treatment and monitoring plan is recommended:     1. Encounter for Medicare annual wellness exam  Ed is a pleasant 75 year old here today for his Medicare wellness visit. He has no acute concerns today.    2. Chronic pain of left knee  This is a chronic condition, stable.  He reports he continues to intermittently get pain when he walks " in the left knee.  He states currently it is tolerable.  -Continue to monitor    3. Greater trochanteric bursitis of right hip  This is a chronic condition, stable.  He was having pain in both lateral hips I suspected were bursitis.  He did physical therapy which helped but not completely resolved the symptoms.  He still gets pain in the right lateral hip occasionally, mostly at night.  He states he does not sleep well and wakes up frequently during the night but is not because of the pain.  He will let me know if it is getting worse we can provide a corticosteroid injection.    4. Mild cognitive impairment  This is a new condition.  His HRA screening showed difficulty remembering 2 out of 3 words.  He is also noticing some memory decline himself with some trouble remembering names.  He is thinking of starting Prevagen which is a supplement to help with memory.      Services suggested: No services needed at this time  Health Care Screening: Age-appropriate preventive services recommended by USPTF and ACIP covered by Medicare were discussed today. Services ordered if indicated and agreed upon by the patient.  Referrals offered: Community-based lifestyle interventions to reduce health risks and promote self-management and wellness, fall prevention, nutrition, physical activity, tobacco-use cessation, weight loss, and mental health services as per orders if indicated.    Discussion today about general wellness and lifestyle habits:    · Prevent falls and reduce trip hazards; Cautioned about securing or removing rugs.  · Have a working fire alarm and carbon monoxide detector;   · Engage in regular physical activity and social activities     Follow-up: Return in about 1 year (around 9/23/2021) for Medicare Annual/wellness visit.

## 2020-09-23 NOTE — LETTER
[facility]  Lo Fine M.D.  [address] [place] [address]  Fax: [phone]   Authorization for Release/Disclosure of   Protected Health Information   Name: ED ALVAREZ : 1945 SSN: [id number]   Address: [facility] [place] [address] Phone:    [phone] (home)    I authorize the entity listed below to release/disclose the PHI below to:   [facility]/Lo Fine M.D. and Lo Fine M.D.   Provider or Entity Name:  [facility]   Address   City, State, Zip   Phone:      Fax:     Reason for request: continuity of care   Information to be released:    [X] LAST COLONOSCOPY,  including any PATH REPORT and follow-up  [  ] LAST FIT/COLOGUARD RESULT [  ] LAST DEXA  [  ] LAST MAMMOGRAM  [  ] LAST PAP  [  ] LAST LABS [  ] RETINA EXAM REPORT  [X] IMMUNIZATION RECORDS  [  ] Release all info      [  ] Check here and initial the line next to each item to release ALL health information INCLUDING  _____ Care and treatment for drug and / or alcohol abuse  _____ HIV testing, infection status, or AIDS  _____ Genetic Testing    DATES OF SERVICE OR TIME PERIOD TO BE DISCLOSED: _____________  I understand and acknowledge that:  * This Authorization may be revoked at any time by you in writing, except if your health information has already been used or disclosed.  * Your health information that will be used or disclosed as a result of you signing this authorization could be re-disclosed by the recipient. If this occurs, your re-disclosed health information may no longer be protected by State or Federal laws.  * You may refuse to sign this Authorization. Your refusal will not affect your ability to obtain treatment.  * This Authorization becomes effective upon signing and will  on (date) __________.      If no date is indicated, this Authorization will  one (1) year from the signature date.    Name: Ed Alvarez    Signature:   Date:     2020       PLEASE FAX  REQUESTED RECORDS BACK TO: [phone]

## 2020-10-12 ENCOUNTER — NON-PROVIDER VISIT (OUTPATIENT)
Dept: MEDICAL GROUP | Facility: PHYSICIAN GROUP | Age: 75
End: 2020-10-12
Payer: MEDICARE

## 2020-10-12 DIAGNOSIS — Z23 NEED FOR IMMUNIZATION AGAINST INFLUENZA: ICD-10-CM

## 2020-10-12 PROCEDURE — 90662 IIV NO PRSV INCREASED AG IM: CPT | Performed by: FAMILY MEDICINE

## 2020-10-12 PROCEDURE — G0008 ADMIN INFLUENZA VIRUS VAC: HCPCS | Performed by: FAMILY MEDICINE

## 2020-10-12 NOTE — PROGRESS NOTES
"Ed Alvarez is a 75 y.o. male here for a non-provider visit for:   FLU    Reason for immunization: Annual Flu Vaccine  Immunization records indicate need for vaccine: Yes, confirmed with Epic  Minimum interval has been met for this vaccine: Yes  ABN completed: Yes    Order and dose verified by: Dara MANDEL Dated  8/15/2019 was given to patient: Yes  All IAC Questionnaire questions were answered \"No.\"    Patient tolerated injection and no adverse effects were observed or reported: Yes    Pt scheduled for next dose in series: Not Indicated    "

## 2020-12-21 ENCOUNTER — PATIENT OUTREACH (OUTPATIENT)
Dept: HEALTH INFORMATION MANAGEMENT | Facility: OTHER | Age: 75
End: 2020-12-21

## 2020-12-21 NOTE — PROGRESS NOTES
Past 3 days patient has had dizziness and and wants to have Covid test. Reached out to Nurse triage line. Since dizziness is not a regular symptom, Nurse Selena informed patient can have a regular visit with PCP. Patient informed and declined an appointment. Will reach out if additional symptoms appear or current dizziness increases.

## 2021-01-01 ENCOUNTER — TELEPHONE (OUTPATIENT)
Dept: HEALTH INFORMATION MANAGEMENT | Facility: OTHER | Age: 76
End: 2021-01-01

## 2021-01-01 RX ORDER — ATORVASTATIN CALCIUM 20 MG/1
20 TABLET, FILM COATED ORAL DAILY
Qty: 90 TABLET | Refills: 3 | Status: SHIPPED | OUTPATIENT
Start: 2021-01-01 | End: 2022-01-01 | Stop reason: SDUPTHER

## 2021-01-11 ENCOUNTER — TELEPHONE (OUTPATIENT)
Dept: HEALTH INFORMATION MANAGEMENT | Facility: OTHER | Age: 76
End: 2021-01-11

## 2021-01-11 ENCOUNTER — PATIENT OUTREACH (OUTPATIENT)
Dept: HEALTH INFORMATION MANAGEMENT | Facility: OTHER | Age: 76
End: 2021-01-11

## 2021-01-11 DIAGNOSIS — R97.20 ELEVATED PSA: ICD-10-CM

## 2021-01-11 NOTE — TELEPHONE ENCOUNTER
1. Caller Name: Ed Alvarez                          Call Back Number: [phone] (home)       How would the patient prefer to be contacted with a response: Phone call do NOT leave a detailed message    2. SPECIFIC Action To Be Taken:     3. Diagnosis/Clinical Reason for Request: Routine Labs, patient states his PSA was high [facility].    4. Specialty & Provider Name/Lab/Imaging Location: [facility]    5. Is appointment scheduled for requested order/referral: no    Patient was informed they will receive a return phone call from the office ONLY if there are any questions before processing their request. Advised to call back if they haven't received a call from the referral department in 5 days.

## 2021-01-12 DIAGNOSIS — R97.20 ELEVATED PSA, BETWEEN 10 AND LESS THAN 20 NG/ML: ICD-10-CM

## 2021-01-12 DIAGNOSIS — R97.20 ELEVATED PSA: ICD-10-CM

## 2021-01-12 NOTE — PROGRESS NOTES
Called pt back and stated PCP recommend pt to see Urology provider. Pt stated he only asked for order. Adv to pt this is what PCP adv for pt to do and if pt wants to discus with PCP we can schedule an appointment. Pt declined and stated he will just wait to hear from office to schedule appt.

## 2021-01-12 NOTE — PROGRESS NOTES
Informed by referral department patient was frustrated that he was referred to urology without rechecking the PSA. I've ordered the blood test. However, his PSA was more than double a normal PSA and he will still likely have to see urology.    Please tell him I've ordered a PSA, but I'm not cancelling the urology referral.

## 2021-01-12 NOTE — TELEPHONE ENCOUNTER
Hello,      Pt is wanting an order for a PSA level. Pt stated when he last completed one results were 10.37.    Please Yancy ramon

## 2021-01-15 DIAGNOSIS — Z23 NEED FOR VACCINATION: ICD-10-CM

## 2021-01-27 NOTE — PROGRESS NOTES
Pt called regarding COVD vaccine. Adv we still dont have the schedule open for next week. Pt also requested to reschedule lab appointment for today due to snow but then stated to call him back at 10am. Adv will follow up

## 2021-02-03 ENCOUNTER — IMMUNIZATION (OUTPATIENT)
Dept: FAMILY PLANNING/WOMEN'S HEALTH CLINIC | Facility: IMMUNIZATION CENTER | Age: 76
End: 2021-02-03
Attending: INTERNAL MEDICINE
Payer: MEDICARE

## 2021-02-03 DIAGNOSIS — Z23 ENCOUNTER FOR VACCINATION: Primary | ICD-10-CM

## 2021-02-03 DIAGNOSIS — Z23 NEED FOR VACCINATION: ICD-10-CM

## 2021-02-03 PROCEDURE — 91300 PFIZER SARS-COV-2 VACCINE: CPT

## 2021-02-03 PROCEDURE — 0001A PFIZER SARS-COV-2 VACCINE: CPT

## 2021-02-09 ENCOUNTER — HOSPITAL ENCOUNTER (OUTPATIENT)
Dept: LAB | Facility: MEDICAL CENTER | Age: 76
End: 2021-02-09
Attending: FAMILY MEDICINE
Payer: MEDICARE

## 2021-02-09 DIAGNOSIS — R97.20 ELEVATED PSA, BETWEEN 10 AND LESS THAN 20 NG/ML: ICD-10-CM

## 2021-02-09 PROCEDURE — 84153 ASSAY OF PSA TOTAL: CPT

## 2021-02-09 PROCEDURE — 36415 COLL VENOUS BLD VENIPUNCTURE: CPT

## 2021-02-09 PROCEDURE — 84154 ASSAY OF PSA FREE: CPT

## 2021-02-11 LAB
PSA FREE MFR SERPL: 13 %
PSA FREE SERPL-MCNC: 1.7 NG/ML
PSA SERPL-MCNC: 12.8 NG/ML (ref 0–4)

## 2021-02-24 ENCOUNTER — IMMUNIZATION (OUTPATIENT)
Dept: FAMILY PLANNING/WOMEN'S HEALTH CLINIC | Facility: IMMUNIZATION CENTER | Age: 76
End: 2021-02-24
Attending: INTERNAL MEDICINE
Payer: MEDICARE

## 2021-02-24 DIAGNOSIS — Z23 ENCOUNTER FOR VACCINATION: Primary | ICD-10-CM

## 2021-02-24 PROCEDURE — 0002A PFIZER SARS-COV-2 VACCINE: CPT

## 2021-02-24 PROCEDURE — 91300 PFIZER SARS-COV-2 VACCINE: CPT

## 2021-07-03 ENCOUNTER — APPOINTMENT (OUTPATIENT)
Dept: RADIOLOGY | Facility: MEDICAL CENTER | Age: 76
End: 2021-07-03
Attending: EMERGENCY MEDICINE
Payer: MEDICARE

## 2021-07-03 ENCOUNTER — HOSPITAL ENCOUNTER (EMERGENCY)
Facility: MEDICAL CENTER | Age: 76
End: 2021-07-03
Attending: EMERGENCY MEDICINE
Payer: MEDICARE

## 2021-07-03 VITALS
SYSTOLIC BLOOD PRESSURE: 124 MMHG | HEIGHT: 70 IN | DIASTOLIC BLOOD PRESSURE: 73 MMHG | WEIGHT: 163.8 LBS | OXYGEN SATURATION: 95 % | BODY MASS INDEX: 23.45 KG/M2 | TEMPERATURE: 96.8 F | HEART RATE: 58 BPM | RESPIRATION RATE: 16 BRPM

## 2021-07-03 DIAGNOSIS — N20.1 URETERAL STONE: ICD-10-CM

## 2021-07-03 LAB
ALBUMIN SERPL BCP-MCNC: 4.1 G/DL (ref 3.2–4.9)
ALBUMIN/GLOB SERPL: 1.4 G/DL
ALP SERPL-CCNC: 59 U/L (ref 30–99)
ALT SERPL-CCNC: 19 U/L (ref 2–50)
ANION GAP SERPL CALC-SCNC: 11 MMOL/L (ref 7–16)
APPEARANCE UR: CLEAR
AST SERPL-CCNC: 20 U/L (ref 12–45)
BACTERIA #/AREA URNS HPF: NEGATIVE /HPF
BASOPHILS # BLD AUTO: 0.6 % (ref 0–1.8)
BASOPHILS # BLD: 0.06 K/UL (ref 0–0.12)
BILIRUB SERPL-MCNC: 0.7 MG/DL (ref 0.1–1.5)
BILIRUB UR QL STRIP.AUTO: NEGATIVE
BUN SERPL-MCNC: 18 MG/DL (ref 8–22)
CALCIUM SERPL-MCNC: 9.4 MG/DL (ref 8.5–10.5)
CHLORIDE SERPL-SCNC: 105 MMOL/L (ref 96–112)
CO2 SERPL-SCNC: 24 MMOL/L (ref 20–33)
COLOR UR: YELLOW
CREAT SERPL-MCNC: 1.18 MG/DL (ref 0.5–1.4)
EOSINOPHIL # BLD AUTO: 0.04 K/UL (ref 0–0.51)
EOSINOPHIL NFR BLD: 0.4 % (ref 0–6.9)
EPI CELLS #/AREA URNS HPF: NEGATIVE /HPF
ERYTHROCYTE [DISTWIDTH] IN BLOOD BY AUTOMATED COUNT: 45.8 FL (ref 35.9–50)
GLOBULIN SER CALC-MCNC: 2.9 G/DL (ref 1.9–3.5)
GLUCOSE SERPL-MCNC: 121 MG/DL (ref 65–99)
GLUCOSE UR STRIP.AUTO-MCNC: NEGATIVE MG/DL
HCT VFR BLD AUTO: 45.8 % (ref 42–52)
HGB BLD-MCNC: 15.4 G/DL (ref 14–18)
HYALINE CASTS #/AREA URNS LPF: ABNORMAL /LPF
IMM GRANULOCYTES # BLD AUTO: 0.03 K/UL (ref 0–0.11)
IMM GRANULOCYTES NFR BLD AUTO: 0.3 % (ref 0–0.9)
KETONES UR STRIP.AUTO-MCNC: NEGATIVE MG/DL
LEUKOCYTE ESTERASE UR QL STRIP.AUTO: NEGATIVE
LIPASE SERPL-CCNC: 20 U/L (ref 11–82)
LYMPHOCYTES # BLD AUTO: 0.92 K/UL (ref 1–4.8)
LYMPHOCYTES NFR BLD: 9.4 % (ref 22–41)
MCH RBC QN AUTO: 30.4 PG (ref 27–33)
MCHC RBC AUTO-ENTMCNC: 33.6 G/DL (ref 33.7–35.3)
MCV RBC AUTO: 90.3 FL (ref 81.4–97.8)
MICRO URNS: ABNORMAL
MONOCYTES # BLD AUTO: 0.46 K/UL (ref 0–0.85)
MONOCYTES NFR BLD AUTO: 4.7 % (ref 0–13.4)
NEUTROPHILS # BLD AUTO: 8.31 K/UL (ref 1.82–7.42)
NEUTROPHILS NFR BLD: 84.6 % (ref 44–72)
NITRITE UR QL STRIP.AUTO: NEGATIVE
NRBC # BLD AUTO: 0 K/UL
NRBC BLD-RTO: 0 /100 WBC
PH UR STRIP.AUTO: 7.5 [PH] (ref 5–8)
PLATELET # BLD AUTO: 224 K/UL (ref 164–446)
PMV BLD AUTO: 10.6 FL (ref 9–12.9)
POTASSIUM SERPL-SCNC: 4.3 MMOL/L (ref 3.6–5.5)
PROT SERPL-MCNC: 7 G/DL (ref 6–8.2)
PROT UR QL STRIP: NEGATIVE MG/DL
RBC # BLD AUTO: 5.07 M/UL (ref 4.7–6.1)
RBC # URNS HPF: ABNORMAL /HPF
RBC UR QL AUTO: ABNORMAL
SODIUM SERPL-SCNC: 140 MMOL/L (ref 135–145)
SP GR UR STRIP.AUTO: >=1.045
UROBILINOGEN UR STRIP.AUTO-MCNC: 1 MG/DL
WBC # BLD AUTO: 9.8 K/UL (ref 4.8–10.8)
WBC #/AREA URNS HPF: ABNORMAL /HPF

## 2021-07-03 PROCEDURE — 700117 HCHG RX CONTRAST REV CODE 255: Performed by: EMERGENCY MEDICINE

## 2021-07-03 PROCEDURE — 80053 COMPREHEN METABOLIC PANEL: CPT

## 2021-07-03 PROCEDURE — 81001 URINALYSIS AUTO W/SCOPE: CPT

## 2021-07-03 PROCEDURE — 85025 COMPLETE CBC W/AUTO DIFF WBC: CPT

## 2021-07-03 PROCEDURE — 36415 COLL VENOUS BLD VENIPUNCTURE: CPT

## 2021-07-03 PROCEDURE — 83690 ASSAY OF LIPASE: CPT

## 2021-07-03 PROCEDURE — 99284 EMERGENCY DEPT VISIT MOD MDM: CPT

## 2021-07-03 PROCEDURE — 74177 CT ABD & PELVIS W/CONTRAST: CPT | Mod: ME

## 2021-07-03 RX ADMIN — IOHEXOL 100 ML: 350 INJECTION, SOLUTION INTRAVENOUS at 10:00

## 2021-07-03 NOTE — ED NOTES
Pt sitting up in bed, vitals stable. Waiting on CT scan. Aware of need for UA. Call light in reach. Will continue to monitor.

## 2021-07-03 NOTE — ED NOTES
Pt given d/c instructions, including follow up care with Urology. Pt verbalized understanding. Ambulated to lobby without difficulty. Pt friend transporting him home.

## 2021-07-03 NOTE — ED TRIAGE NOTES
"..  Chief Complaint   Patient presents with   • Abdominal Pain     RLQ pain x's 2 days, intermittent. c/o nausea        ./63   Pulse (!) 55   Temp 36 °C (96.8 °F) (Temporal)   Resp 16   Ht 1.778 m (5' 10\")   Wt 74.3 kg (163 lb 12.8 oz)   SpO2 99%        Explained triage process, to waiting room. Asked to inform RN if questions or concerns arise.   "

## 2021-07-03 NOTE — ED PROVIDER NOTES
ED Provider Note    ER PROVIDER NOTE      CHIEF COMPLAINT  Chief Complaint   Patient presents with   • Abdominal Pain     RLQ pain x's 2 days, intermittent. c/o nausea        HPI  Ed Alvarez is a 75 y.o. male with PMH of HLD and low-grade prostate cancer who presents to the emergency department complaining of pain in his right lower abdomen.    He states that this pain began around 3 to 4 AM this morning and began to increase around that time.  He states that it is nonradiating, severe and intermittent, lasting 30 minutes at a time with 20 minutes intervals of no pain.  He did have some nausea this morning without vomiting.  He had pain when he arrived to the ED, but currently he does not have any.  He denies fevers or chills, diarrhea or constipation, hematochezia, melena, dysuria, or testicular pain.  He denies worsening of the pain with coughing he denies chest pain.    He notes that he did have a right-sided hernia for which he had surgery in August.  He notes he had a colonoscopy about 6 months ago which showed several polyps and that his next colonoscopy is due in 5 years.    Regarding his prostate cancer, he states that only 2 of 12 cores of the biopsy showed cancer and that it is low-grade.    REVIEW OF SYSTEMS  Pertinent positives include abdominal pain and nausea. Pertinent negatives include no fevers or chills vomiting, hematochezia, melena, dysuria, chest pain . See HPI for details. All other systems reviewed and are negative.    PAST MEDICAL HISTORY   has a past medical history of Hyperlipidemia.    SURGICAL HISTORY   has a past surgical history that includes laminotomy (1980s); tonsillectomy (1950s); and inguinal hernia repair (Right, 2020).    FAMILY HISTORY  Family History   Problem Relation Age of Onset   • Cancer Mother         lung (smoker)   • No Known Problems Father    • Dementia Brother         Lewy Body   • Diabetes Neg Hx    • Heart Disease Neg Hx    • Stroke Neg Hx        SOCIAL  HISTORY  Social History     Socioeconomic History   • Marital status: Single     Spouse name: Not on file   • Number of children: Not on file   • Years of education: Not on file   • Highest education level: Not on file   Occupational History   • Not on file   Tobacco Use   • Smoking status: Former Smoker     Packs/day: 0.15     Years: 50.00     Pack years: 7.50     Quit date:      Years since quittin.5   • Smokeless tobacco: Never Used   Vaping Use   • Vaping Use: Never used   Substance and Sexual Activity   • Alcohol use: Yes     Comment: occasional    • Drug use: No   • Sexual activity: Not Currently     Partners: Female   Other Topics Concern   • Not on file   Social History Narrative   • Not on file     Social Determinants of Health     Financial Resource Strain:    • Difficulty of Paying Living Expenses:    Food Insecurity:    • Worried About Running Out of Food in the Last Year:    • Ran Out of Food in the Last Year:    Transportation Needs:    • Lack of Transportation (Medical):    • Lack of Transportation (Non-Medical):    Physical Activity:    • Days of Exercise per Week:    • Minutes of Exercise per Session:    Stress:    • Feeling of Stress :    Social Connections:    • Frequency of Communication with Friends and Family:    • Frequency of Social Gatherings with Friends and Family:    • Attends Yazdanism Services:    • Active Member of Clubs or Organizations:    • Attends Club or Organization Meetings:    • Marital Status:    Intimate Partner Violence:    • Fear of Current or Ex-Partner:    • Emotionally Abused:    • Physically Abused:    • Sexually Abused:       Social History     Substance and Sexual Activity   Drug Use No       CURRENT MEDICATIONS  Home Medications     Reviewed by Gloria Ayon R.N. (Registered Nurse) on 21 at 0751  Med List Status: Not Addressed   Medication Last Dose Status   atorvastatin (LIPITOR) 40 MG Tab  Active   Cholecalciferol (VITAMIN D-3 PO)  Active  "  Cyanocobalamin (VITAMIN B12 PO)  Active                ALLERGIES  No Known Allergies    PHYSICAL EXAM  VITAL SIGNS: /72   Pulse 62   Temp 36 °C (96.8 °F) (Temporal)   Resp 16   Ht 1.778 m (5' 10\")   Wt 74.3 kg (163 lb 12.8 oz)   SpO2 97%   BMI 23.50 kg/m²   Pulse ox interpretation: I interpret this pulse ox as normal.    Constitutional: Alert in no apparent distress.  HENT: No signs of trauma, Bilateral external ears normal, Nose normal.   Eyes: Pupils are equal and reactive, Conjunctiva normal, Non-icteric.   Neck: Normal range of motion, No tenderness, Supple, No stridor.   Lymphatic: No lymphadenopathy noted.   Cardiovascular: Regular rate and rhythm, no murmurs.   Thorax & Lungs: Normal breath sounds, No respiratory distress, No wheezing, No chest tenderness.   Abdomen: Bowel sounds normal, Soft, mild tenderness to palpation in right lower quadrant, No masses, No pulsatile masses. No peritoneal signs.  Skin: Warm, Dry, No erythema, No rash.   Back: No bony tenderness, No CVA tenderness.   Extremities: Intact distal pulses, No edema, No tenderness, No cyanosis  Musculoskeletal: Good range of motion in all major joints. No tenderness to palpation or major deformities noted.   Neurologic: Alert , Normal motor function, Normal sensory function, No focal deficits noted.   Psychiatric: Affect normal, Judgment normal, Mood normal.     DIAGNOSTIC STUDIES / PROCEDURES      LABS  CBC remarkable for WBC in upper limit of normal with neutrophilia  CMP unremarkable except for mild hyperglycemia  Lipase wnl  UA remarkable only for small amount of WBCs and RBCs   All labs reviewed by me.    RADIOLOGY  CT-ABDOMEN-PELVIS WITH   Final Result      1.  Obstructing 3 mm calculus in the distal right ureter results in mild right hydroureteronephrosis and periureteral stranding      2.  Left nephrolithiasis.          The radiologist's interpretation of all radiological studies have been reviewed and images independently " viewed by me.    COURSE & MEDICAL DECISION MAKING  Nursing notes, VS, PMSFHx reviewed in chart.    8:55 AM Patient seen and examined at bedside. Patient will be treated with outpatient pain medication and oral hydration. Ordered for CT abdomen and pelvis with contrast and urinalysis to evaluate his symptoms.   10:20 AM patient reevaluated and was without pain  CT abdomen resulted, showing 3 mm stone in distal right ureter with mild obstruction  10:55 AM spoke with patient about obtaining UA to rule out UTI and patient agreed. He was still without any pain.     Patient states he is comfortable, does not require any pain medications at home    Decision Making:  This is a 75 y.o. male with PMH of HLD and low-grade prostate cancer who presents to the emergency department complaining of severe, sudden onset, intermittent abdominal pain in the right lower quadrant.    #Right kidney stone  Pain is likely secondary to right ureteral kidney stone, given CT imaging and spasmodic pain. Initially it was concerning for early developing appendicitis given pain is in the area of McBurney's point but less likely given the lack of leukocytosis or fever.  Hernia pain secondary to incarceration or strangulation was also a possibility, but ruled out with history and CT imaging.  UTI less likely given no dysuria and UA was unremarkable.         The patient will return for new or worsening symptoms and is stable at the time of discharge.  He will follow up with urology, states he is comfortable with ibuprofen at home    The patient is referred to a primary physician for blood pressure management, diabetic screening, and for all other preventative health concerns.      DISPOSITION:  Patient will be discharged home in stable condition.    FOLLOW UP:  Sarabjit Blanco M.D.  [address]  [place] [address]  [phone]    In 1 week        OUTPATIENT MEDICATIONS:  New Prescriptions    No medications on file         FINAL IMPRESSION  1.  Right kidney stone         #Right kidney stone  -Likely the cause of his right lower quadrant spasmodic and intermittent abdominal pain  -CT abdomen and pelvis showed obstructing 3 mm stone in distal right ureter  -Urinalysis not revealing infecition  -Advised patient to drink plenty of water and that stone will likely pass on its own.  -Advised patient to return to ED if he develops fevers or chills, increased abdominal pain or change in the nature of his pain        I independently evaluated the patient and repeated the important components of the history and physical. I discussed the management with the resident. I have reviewed and agree with the pertinent clinical information above including history, exam, study findings, and recommendations.   Electronically signed by: Fredi Dong M.D., 7/3/2021 11:43 AM         The note accurately reflects work and decisions made by me.  Fredi Dong M.D.  7/3/2021  11:45 AM

## 2021-08-04 ENCOUNTER — HOSPITAL ENCOUNTER (OUTPATIENT)
Dept: LAB | Facility: MEDICAL CENTER | Age: 76
End: 2021-08-04
Attending: UROLOGY
Payer: MEDICARE

## 2021-08-04 LAB — PSA SERPL-MCNC: 11.4 NG/ML (ref 0–4)

## 2021-08-04 PROCEDURE — 84153 ASSAY OF PSA TOTAL: CPT

## 2021-08-04 PROCEDURE — 36415 COLL VENOUS BLD VENIPUNCTURE: CPT

## 2021-09-08 ENCOUNTER — OFFICE VISIT (OUTPATIENT)
Dept: MEDICAL GROUP | Facility: PHYSICIAN GROUP | Age: 76
End: 2021-09-08
Payer: MEDICARE

## 2021-09-08 VITALS
SYSTOLIC BLOOD PRESSURE: 110 MMHG | HEART RATE: 57 BPM | HEIGHT: 70 IN | DIASTOLIC BLOOD PRESSURE: 70 MMHG | BODY MASS INDEX: 24.52 KG/M2 | TEMPERATURE: 97.9 F | WEIGHT: 171.3 LBS | OXYGEN SATURATION: 98 %

## 2021-09-08 DIAGNOSIS — M54.50 ACUTE RIGHT-SIDED LOW BACK PAIN WITHOUT SCIATICA: ICD-10-CM

## 2021-09-08 PROCEDURE — 99213 OFFICE O/P EST LOW 20 MIN: CPT | Performed by: STUDENT IN AN ORGANIZED HEALTH CARE EDUCATION/TRAINING PROGRAM

## 2021-09-08 RX ORDER — ATORVASTATIN CALCIUM 10 MG/1
TABLET, FILM COATED ORAL
COMMUNITY
End: 2021-09-08

## 2021-09-08 RX ORDER — ATORVASTATIN CALCIUM 20 MG/1
20 TABLET, FILM COATED ORAL DAILY
Qty: 90 TABLET | Refills: 0 | Status: SHIPPED | OUTPATIENT
Start: 2021-09-08 | End: 2021-01-01 | Stop reason: SDUPTHER

## 2021-09-08 ASSESSMENT — PATIENT HEALTH QUESTIONNAIRE - PHQ9: CLINICAL INTERPRETATION OF PHQ2 SCORE: 0

## 2021-09-08 ASSESSMENT — FIBROSIS 4 INDEX: FIB4 SCORE: 1.52

## 2021-09-08 NOTE — PATIENT INSTRUCTIONS
1) Use ibuprofen 800mg three times per day as needed.    2) You may use tylenol  1000mg up  three times per day.    3) Use a heating pad if it helps.    4) Do not lay down anymore than necessary for sleep and heating pad time. Walk and do light stretching exercises as tolerated.

## 2021-09-08 NOTE — PROGRESS NOTES
"CC:  The encounter diagnosis was Acute right-sided low back pain without sciatica.    HISTORY OF THE PRESENT ILLNESS: Patient is a 76 y.o. male. This pleasant patient is here today to discuss recent onset of low back pain associated with his stretching regimen.. His  PCP is Dr. Lo Fine MD.    1.  Acute right-sided low back pain without sciatica  This began about 1 week ago.  Mr. Alvarez believes he may have been associated with his rather extensive stretching regimen that he does every day, he feels that he may have overdone it.  No other recollection of overexertion or heavy lifting.  The pain is located on the right side below the pelvic brim the patient indicates with his hand an area consistent with the right-sided SI joint.  The pain is constant although worse when rising from rest.  The pain is relieved by a heating pad tried no other treatments.  He has not been spending more time than usual in bed, approximately 8 hours per night for sleeping.  He has however been laying down on the couch more often than usual because of this pain.  No Tylenol, no NSAIDs.    No problem-specific Assessment & Plan notes found for this encounter.      Current Outpatient Medications Ordered in Epic   Medication Sig Dispense Refill   • atorvastatin (LIPITOR) 20 MG Tab Take 1 Tablet by mouth every day. 90 Tablet 0   • Cyanocobalamin (VITAMIN B12 PO) Take  by mouth.     • Cholecalciferol (VITAMIN D-3 PO) Take  by mouth.       No current Epic-ordered facility-administered medications on file.       ROS:   Gen: no fevers/chills, unplanned changes in weight  Pulm: no sob, no cough  CV: no chest pain/pressure, no palpitations  MSk: See HPI    Objective:     Exam: /70 (BP Location: Left arm, Patient Position: Sitting, BP Cuff Size: Adult)   Pulse (!) 57   Temp 36.6 °C (97.9 °F) (Temporal)   Ht 1.778 m (5' 10\")   Wt 77.7 kg (171 lb 4.8 oz)   SpO2 98%  Body mass index is 24.58 kg/m².    General: Normal appearing. No " distress.  Neurologic: Grossly nonfocal  Skin: Warm and dry.  No obvious lesions.  Musculoskeletal: Normal gait. No extremity cyanosis, clubbing, or edema.  Low back: Normal active range of motion.  Some pain when lifting the suitcase bilaterally.  Right-sided SI joint tenderness upon palpation.  Negative Gaenslen's test.  Psych: Normal mood and affect. Alert and oriented x3. Judgment and insight is normal.    A chaperone was offered to the patient during today's exam. Patient declined chaperone.    Labs:   7/3/2021:  -CMP reveals normal renal and hepatic function.    Assessment & Plan:   76 y.o. male with the following -    1. Acute right-sided low back pain without sciatica  -Likely right-sided SI joint sprain.  No imaging is indicated at this point.  -Ibuprofen 800 mg 3 times daily as needed.  -Acetaminophen 1 g 3 times daily as needed.  -Continue with a heating pad if this is a value  -Light stretching and active rest recommended.  We discussed recommendations and cautions of this.    Return if symptoms worsen or fail to improve.  I recommended that if after 3 weeks or a total of 4 weeks since symptoms began that the patient should return to clinic if there is not substantial resolution.    Please note that this dictation was created using voice recognition software. I have made every reasonable attempt to correct obvious errors, but I expect that there are errors of grammar and possibly content that I did not discover before finalizing the note.    Shawn Miller PA-C 9/8/2021

## 2021-09-27 ENCOUNTER — OFFICE VISIT (OUTPATIENT)
Dept: MEDICAL GROUP | Facility: PHYSICIAN GROUP | Age: 76
End: 2021-09-27
Payer: MEDICARE

## 2021-09-27 VITALS
HEIGHT: 70 IN | OXYGEN SATURATION: 97 % | RESPIRATION RATE: 16 BRPM | TEMPERATURE: 97.6 F | SYSTOLIC BLOOD PRESSURE: 116 MMHG | HEART RATE: 64 BPM | DIASTOLIC BLOOD PRESSURE: 72 MMHG | BODY MASS INDEX: 24.08 KG/M2 | WEIGHT: 168.21 LBS

## 2021-09-27 DIAGNOSIS — G89.29 CHRONIC PAIN OF LEFT KNEE: ICD-10-CM

## 2021-09-27 DIAGNOSIS — Z00.00 ENCOUNTER FOR MEDICARE ANNUAL WELLNESS EXAM: Primary | ICD-10-CM

## 2021-09-27 DIAGNOSIS — M70.61 GREATER TROCHANTERIC BURSITIS OF RIGHT HIP: ICD-10-CM

## 2021-09-27 DIAGNOSIS — Z11.59 NEED FOR HEPATITIS C SCREENING TEST: ICD-10-CM

## 2021-09-27 DIAGNOSIS — Z23 NEED FOR VACCINATION: ICD-10-CM

## 2021-09-27 DIAGNOSIS — M25.562 CHRONIC PAIN OF LEFT KNEE: ICD-10-CM

## 2021-09-27 DIAGNOSIS — C61 MALIGNANT TUMOR OF PROSTATE (HCC): ICD-10-CM

## 2021-09-27 PROBLEM — R52 PAIN: Status: RESOLVED | Noted: 2019-05-22 | Resolved: 2021-09-27

## 2021-09-27 PROCEDURE — G0008 ADMIN INFLUENZA VIRUS VAC: HCPCS | Performed by: FAMILY MEDICINE

## 2021-09-27 PROCEDURE — 90662 IIV NO PRSV INCREASED AG IM: CPT | Performed by: FAMILY MEDICINE

## 2021-09-27 PROCEDURE — G0439 PPPS, SUBSEQ VISIT: HCPCS | Mod: 25 | Performed by: FAMILY MEDICINE

## 2021-09-27 RX ORDER — MULTIVITAMIN WITH IRON
100 TABLET ORAL DAILY
COMMUNITY
End: 2022-01-01

## 2021-09-27 ASSESSMENT — FIBROSIS 4 INDEX: FIB4 SCORE: 1.52

## 2021-09-27 ASSESSMENT — ENCOUNTER SYMPTOMS: GENERAL WELL-BEING: GOOD

## 2021-09-27 ASSESSMENT — ACTIVITIES OF DAILY LIVING (ADL): BATHING_REQUIRES_ASSISTANCE: 0

## 2021-09-27 ASSESSMENT — PATIENT HEALTH QUESTIONNAIRE - PHQ9: CLINICAL INTERPRETATION OF PHQ2 SCORE: 0

## 2021-09-27 NOTE — PROGRESS NOTES
Chief Complaint   Patient presents with   • Annual Wellness Visit         HPI:  Ed is a 76 y.o. here for Medicare Annual Wellness Visit      Patient Active Problem List    Diagnosis Date Noted   • Malignant tumor of prostate (HCC) 04/12/2021   • Chronic pain of left knee 03/23/2020   • Greater trochanteric bursitis of right hip 02/06/2020       Current Outpatient Medications   Medication Sig Dispense Refill   • Pyridoxine HCl (VITAMIN B-6) 100 MG Tab Take  by mouth.     • Misc Natural Products (NEURIVA PO) Take  by mouth.     • atorvastatin (LIPITOR) 20 MG Tab Take 1 Tablet by mouth every day. 90 Tablet 0   • Cyanocobalamin (VITAMIN B12 PO) Take  by mouth.     • Cholecalciferol (VITAMIN D-3 PO) Take  by mouth.       No current facility-administered medications for this visit.        Patient is taking medications as noted in medication list.  Current supplements as per medication list.     Allergies: Patient has no known allergies.    Current social contact/activities: Neighbors     Is patient current with immunizations? No, due for FLU, TDAP and SHINGRIX (Shingles). Patient is interested in receiving FLU today.    He  reports that he quit smoking about 14 years ago. He has a 7.50 pack-year smoking history. He has never used smokeless tobacco. He reports current alcohol use. He reports that he does not use drugs.  Counseling given: Yes      DPA/Advanced directive: Patient has Living Will and Durable Power of  on file.     ROS:    Gait: Uses no assistive device   Ostomy: No   Other tubes: No   Amputations: No   Chronic oxygen use No   Last eye exam 6/2021  Wears hearing aids: No   : Denies any urinary leakage during the last 6 months    Screening:    Depression Screening    Little interest or pleasure in doing things?  0 - not at all  Feeling down, depressed, or hopeless? 0 - not at all  Patient Health Questionnaire Score: 0    If depressive symptoms identified deferred to follow up visit unless  specifically addressed in assessment and plan.    Interpretation of PHQ-9 Total Score   Score Severity   1-4 No Depression   5-9 Mild Depression   10-14 Moderate Depression   15-19 Moderately Severe Depression   20-27 Severe Depression    Screening for Cognitive Impairment    Three Minute Recall (captain, garden, picture)  0/3    Farhat clock face with all 12 numbers and set the hands to show 5 past 8.  Yes 4/5  If cognitive concerns identified, deferred for follow up unless specifically addressed in assessment and plan.    Fall Risk Assessment    Has the patient had two or more falls in the last year or any fall with injury in the last year?  No  If fall risk identified, deferred for follow up unless specifically addressed in assessment and plan.    Safety Assessment    Throw rugs on floor.  Yes  Handrails on all stairs.  No  Good lighting in all hallways.  Yes  Difficulty hearing.  No  Patient counseled about all safety risks that were identified.    Functional Assessment ADLs    Are there any barriers preventing you from cooking for yourself or meeting nutritional needs?  No.    Are there any barriers preventing you from driving safely or obtaining transportation?  No.    Are there any barriers preventing you from using a telephone or calling for help?  No.    Are there any barriers preventing you from shopping?  No.    Are there any barriers preventing you from taking care of your own finances?  No.    Are there any barriers preventing you from managing your medications?  No.    Are there any barriers preventing you from showering, bathing or dressing yourself?  No.    Are you currently engaging in any exercise or physical activity?  Yes.     What is your perception of your health?  Good.    Health Maintenance Summary                HEPATITIS C SCREENING Overdue 1945     IMM ZOSTER VACCINES Overdue 2/26/2011      Done 1/1/2011 Imm Admin: Zoster Vaccine Live (ZVL) (Zostavax) - HISTORICAL DATA    IMM  "DTaP/Tdap/Td Vaccine Overdue 2021      Done 2011 Imm Admin: Tdap Vaccine    IMM INFLUENZA Overdue 2021      Done 10/12/2020 Imm Admin: Influenza Vaccine Adult HD     Patient has more history with this topic...    Annual Wellness Visit Next Due 2022      Done 2021 Visit Dx: Encounter for Medicare annual wellness exam     Patient has more history with this topic...    COLORECTAL CANCER SCREENING Next Due 2025           Patient Care Team:  Lo Fine M.D. as PCP - General (Family Medicine)  Troy Gibbs Ass't as [facility]   Jf Salazar, PT as Physical Therapist (Physical Therapy)  Deshawn Reed M.D. as Consulting Physician (Surgery)    Social History     Tobacco Use   • Smoking status: Former Smoker     Packs/day: 0.15     Years: 50.00     Pack years: 7.50     Quit date:      Years since quittin.7   • Smokeless tobacco: Never Used   Vaping Use   • Vaping Use: Never used   Substance Use Topics   • Alcohol use: Yes     Comment: occasional    • Drug use: No     Family History   Problem Relation Age of Onset   • Cancer Mother         lung (smoker)   • No Known Problems Father    • Dementia Brother         Lewy Body   • Diabetes Neg Hx    • Heart Disease Neg Hx    • Stroke Neg Hx      He  has a past medical history of Hyperlipidemia.   Past Surgical History:   Procedure Laterality Date   • INGUINAL HERNIA REPAIR Right    • LAMINOTOMY     • TONSILLECTOMY             Exam:     /72 (BP Location: Left arm, Patient Position: Sitting, BP Cuff Size: Adult)   Pulse 64   Temp 36.4 °C (97.6 °F) (Temporal)   Resp 16   Ht 1.778 m (5' 10\")   Wt 76.3 kg (168 lb 3.4 oz)   SpO2 97%  Body mass index is 24.14 kg/m².    Hearing good.    Dentition good  Alert, oriented in no acute distress.  Eye contact is good, speech goal directed, affect calm    Assessment and Plan. The following treatment and monitoring plan is recommended:      1. " Encounter for Medicare annual wellness exam  2. Need for hepatitis C screening test  3. Need for vaccination  Andrew is a pleasant 76-year-old man here today.  His HRA screening shows difficulty recalling any of the 3 words and he reports today that because he could not hear the words when they were given to him.  Therefore, we will reevaluate at his next visit.  He is due for hepatitis C screening which has been ordered and we provided the flu shot today.  - HCV Scrn ( [id number] 1xLife); Future  - INFLUENZA VACCINE, HIGH DOSE (65+ ONLY)    4. Malignant tumor of prostate (HCC)  This is a chronic condition, stable.  This is a relatively new diagnosis and he is established with urology.  He reports that he is going to check his PSA after our visit and with those PSA results they will determine a treatment plan.  He will follow with urology as directed.    5. Chronic pain of left knee  This is a chronic condition, stable.  He has had chronic pain in the left knee since 2014.  However, he states that it is tolerable and remained stable.  We will continue to monitor.    6. Greater trochanteric bursitis of right hip  This is a chronic condition, improved.  Physical therapy had only been somewhat helpful but he is finding NSAIDs helpful.  We did discuss that he can use NSAIDs as needed with food.  We will continue to monitor.      Services suggested: No services needed at this time  Health Care Screening recommendations as per orders if indicated.  Referrals offered: PT/OT/Nutrition counseling/Behavioral Health/Smoking cessation as per orders if indicated.    Discussion today about general wellness and lifestyle habits:    · Prevent falls and reduce trip hazards; Cautioned about securing or removing rugs.  · Have a working fire alarm and carbon monoxide detector;   · Engage in regular physical activity and social activities       Follow-up: Return in about 6 months (around 3/27/2022) for Med check.

## 2021-09-30 ENCOUNTER — HOSPITAL ENCOUNTER (OUTPATIENT)
Dept: LAB | Facility: MEDICAL CENTER | Age: 76
End: 2021-09-30
Attending: UROLOGY
Payer: MEDICARE

## 2021-09-30 LAB — PSA SERPL-MCNC: 13.9 NG/ML (ref 0–4)

## 2021-09-30 PROCEDURE — 36415 COLL VENOUS BLD VENIPUNCTURE: CPT

## 2021-09-30 PROCEDURE — 84153 ASSAY OF PSA TOTAL: CPT

## 2021-11-10 ENCOUNTER — HOSPITAL ENCOUNTER (OUTPATIENT)
Dept: LAB | Facility: MEDICAL CENTER | Age: 76
End: 2021-11-10
Attending: UROLOGY
Payer: MEDICARE

## 2021-11-10 LAB — PSA SERPL-MCNC: 11.4 NG/ML (ref 0–4)

## 2021-11-10 PROCEDURE — 36415 COLL VENOUS BLD VENIPUNCTURE: CPT

## 2021-11-10 PROCEDURE — 84153 ASSAY OF PSA TOTAL: CPT

## 2021-12-09 NOTE — TELEPHONE ENCOUNTER
Outcome: Pt called needing assistance to reschedule [facility] appt. Adv can call office to see what they have available. Pt requested same day @ 1pm. Called [facility] spoke to . They stated provider is [facility]. Scheduled first available 02/15 11am @ [place] location    Please transfer to Patient Outreach Team at [phone] when patient returns call.      [facility] Verified: yes    Attempt # 1

## 2021-12-17 NOTE — TELEPHONE ENCOUNTER
Member called in regarding a medication refill. Verified HIPAA. I advised that medication refill was sent in and confirmed by pharmacy on 12/13/2021 at 4:00 pm. I advised that medication was sent to the [facility] on [address]  and [facility]. He understood. Used Epic and [employer].

## 2022-01-01 ENCOUNTER — HOSPITAL ENCOUNTER (OUTPATIENT)
Dept: LAB | Facility: MEDICAL CENTER | Age: 77
End: 2022-11-16
Attending: PSYCHIATRY & NEUROLOGY
Payer: MEDICARE

## 2022-01-01 ENCOUNTER — HOSPITAL ENCOUNTER (OUTPATIENT)
Dept: LAB | Facility: MEDICAL CENTER | Age: 77
End: 2022-02-11
Attending: UROLOGY
Payer: MEDICARE

## 2022-01-01 ENCOUNTER — PHYSICAL THERAPY (OUTPATIENT)
Dept: PHYSICAL THERAPY | Facility: REHABILITATION | Age: 77
End: 2022-01-01
Attending: FAMILY MEDICINE
Payer: MEDICARE

## 2022-01-01 ENCOUNTER — TELEPHONE (OUTPATIENT)
Dept: MEDICAL GROUP | Facility: PHYSICIAN GROUP | Age: 77
End: 2022-01-01

## 2022-01-01 ENCOUNTER — TELEPHONE (OUTPATIENT)
Dept: MEDICAL GROUP | Facility: PHYSICIAN GROUP | Age: 77
End: 2022-01-01
Payer: MEDICARE

## 2022-01-01 ENCOUNTER — APPOINTMENT (OUTPATIENT)
Dept: RADIOLOGY | Facility: MEDICAL CENTER | Age: 77
End: 2022-01-01
Attending: SURGERY

## 2022-01-01 ENCOUNTER — OFFICE VISIT (OUTPATIENT)
Dept: NEUROLOGY | Facility: MEDICAL CENTER | Age: 77
End: 2022-01-01
Attending: PSYCHIATRY & NEUROLOGY
Payer: MEDICARE

## 2022-01-01 ENCOUNTER — HOSPITAL ENCOUNTER (OUTPATIENT)
Dept: RADIOLOGY | Facility: MEDICAL CENTER | Age: 77
End: 2022-10-25
Attending: PSYCHIATRY & NEUROLOGY
Payer: MEDICARE

## 2022-01-01 ENCOUNTER — OFFICE VISIT (OUTPATIENT)
Dept: MEDICAL GROUP | Facility: PHYSICIAN GROUP | Age: 77
End: 2022-01-01
Payer: MEDICARE

## 2022-01-01 ENCOUNTER — APPOINTMENT (OUTPATIENT)
Dept: PHYSICAL THERAPY | Facility: REHABILITATION | Age: 77
End: 2022-01-01
Payer: MEDICARE

## 2022-01-01 ENCOUNTER — HOSPITAL ENCOUNTER (OUTPATIENT)
Dept: LAB | Facility: MEDICAL CENTER | Age: 77
End: 2022-04-14
Attending: FAMILY MEDICINE
Payer: MEDICARE

## 2022-01-01 ENCOUNTER — HOSPITAL ENCOUNTER (EMERGENCY)
Facility: MEDICAL CENTER | Age: 77
End: 2022-11-28
Attending: EMERGENCY MEDICINE

## 2022-01-01 ENCOUNTER — DOCUMENTATION (OUTPATIENT)
Dept: NEUROLOGY | Facility: MEDICAL CENTER | Age: 77
End: 2022-01-01
Payer: MEDICARE

## 2022-01-01 ENCOUNTER — APPOINTMENT (OUTPATIENT)
Dept: RADIOLOGY | Facility: MEDICAL CENTER | Age: 77
End: 2022-01-01

## 2022-01-01 VITALS
HEIGHT: 69 IN | TEMPERATURE: 98.3 F | OXYGEN SATURATION: 97 % | SYSTOLIC BLOOD PRESSURE: 118 MMHG | DIASTOLIC BLOOD PRESSURE: 60 MMHG | HEART RATE: 82 BPM | WEIGHT: 163 LBS | BODY MASS INDEX: 24.14 KG/M2

## 2022-01-01 VITALS
HEIGHT: 70 IN | OXYGEN SATURATION: 98 % | SYSTOLIC BLOOD PRESSURE: 112 MMHG | HEART RATE: 77 BPM | WEIGHT: 163.8 LBS | TEMPERATURE: 97.6 F | BODY MASS INDEX: 23.45 KG/M2 | DIASTOLIC BLOOD PRESSURE: 70 MMHG

## 2022-01-01 VITALS
HEART RATE: 82 BPM | BODY MASS INDEX: 24.76 KG/M2 | HEIGHT: 69 IN | WEIGHT: 167.2 LBS | OXYGEN SATURATION: 96 % | TEMPERATURE: 98.4 F | DIASTOLIC BLOOD PRESSURE: 72 MMHG | SYSTOLIC BLOOD PRESSURE: 124 MMHG

## 2022-01-01 VITALS
OXYGEN SATURATION: 96 % | SYSTOLIC BLOOD PRESSURE: 118 MMHG | TEMPERATURE: 98.4 F | HEIGHT: 69 IN | RESPIRATION RATE: 16 BRPM | HEART RATE: 72 BPM | DIASTOLIC BLOOD PRESSURE: 76 MMHG | BODY MASS INDEX: 25.39 KG/M2 | WEIGHT: 171.4 LBS

## 2022-01-01 VITALS
RESPIRATION RATE: 16 BRPM | BODY MASS INDEX: 24.02 KG/M2 | HEIGHT: 70 IN | WEIGHT: 167.8 LBS | HEART RATE: 90 BPM | TEMPERATURE: 97.1 F | DIASTOLIC BLOOD PRESSURE: 80 MMHG | SYSTOLIC BLOOD PRESSURE: 128 MMHG | OXYGEN SATURATION: 97 %

## 2022-01-01 VITALS
WEIGHT: 168 LBS | HEART RATE: 74 BPM | DIASTOLIC BLOOD PRESSURE: 76 MMHG | SYSTOLIC BLOOD PRESSURE: 124 MMHG | OXYGEN SATURATION: 96 % | BODY MASS INDEX: 24.88 KG/M2 | TEMPERATURE: 97 F | HEIGHT: 69 IN

## 2022-01-01 DIAGNOSIS — C61 PROSTATE CANCER (HCC): ICD-10-CM

## 2022-01-01 DIAGNOSIS — M25.561 ACUTE PAIN OF RIGHT KNEE: ICD-10-CM

## 2022-01-01 DIAGNOSIS — C61 MALIGNANT TUMOR OF PROSTATE (HCC): ICD-10-CM

## 2022-01-01 DIAGNOSIS — F03.A0 MILD NEURODEGENERATIVE DEMENTIA (HCC): ICD-10-CM

## 2022-01-01 DIAGNOSIS — E78.5 DYSLIPIDEMIA: ICD-10-CM

## 2022-01-01 DIAGNOSIS — M25.561 ACUTE PAIN OF RIGHT KNEE: Primary | ICD-10-CM

## 2022-01-01 DIAGNOSIS — I46.9 CARDIAC ARREST (HCC): ICD-10-CM

## 2022-01-01 DIAGNOSIS — E07.9 DISORDER OF THYROID, UNSPECIFIED: ICD-10-CM

## 2022-01-01 DIAGNOSIS — R41.3 MEMORY LOSS: ICD-10-CM

## 2022-01-01 DIAGNOSIS — Z13.79 GENETIC SCREENING: ICD-10-CM

## 2022-01-01 DIAGNOSIS — G30.9 ALZHEIMER'S DISEASE, UNSPECIFIED (CODE) (HCC): ICD-10-CM

## 2022-01-01 DIAGNOSIS — M25.561 CHRONIC PAIN OF BOTH KNEES: Primary | ICD-10-CM

## 2022-01-01 DIAGNOSIS — F22 PARANOID STATE, SIMPLE (HCC): ICD-10-CM

## 2022-01-01 DIAGNOSIS — C61 PROSTATE CANCER (HCC): Primary | ICD-10-CM

## 2022-01-01 DIAGNOSIS — Z23 NEED FOR VACCINATION: ICD-10-CM

## 2022-01-01 DIAGNOSIS — G94 OTHER DISORDERS OF BRAIN IN DISEASES CLASSIFIED ELSEWHERE: ICD-10-CM

## 2022-01-01 DIAGNOSIS — G89.29 CHRONIC PAIN OF BOTH KNEES: Primary | ICD-10-CM

## 2022-01-01 DIAGNOSIS — M25.562 CHRONIC PAIN OF BOTH KNEES: Primary | ICD-10-CM

## 2022-01-01 DIAGNOSIS — F43.9 STRESS: ICD-10-CM

## 2022-01-01 DIAGNOSIS — S09.90XA TRAUMATIC INJURY OF HEAD, INITIAL ENCOUNTER: ICD-10-CM

## 2022-01-01 DIAGNOSIS — F03.A0 MILD NEURODEGENERATIVE DEMENTIA (HCC): Primary | ICD-10-CM

## 2022-01-01 LAB
ALBUMIN SERPL BCP-MCNC: 4.3 G/DL (ref 3.2–4.9)
ALBUMIN/GLOB SERPL: 2 G/DL
ALP SERPL-CCNC: 54 U/L (ref 30–99)
ALT SERPL-CCNC: 24 U/L (ref 2–50)
ANION GAP SERPL CALC-SCNC: 12 MMOL/L (ref 7–16)
AST SERPL-CCNC: 22 U/L (ref 12–45)
BILIRUB SERPL-MCNC: 0.9 MG/DL (ref 0.1–1.5)
BUN SERPL-MCNC: 13 MG/DL (ref 8–22)
CALCIUM SERPL-MCNC: 9.4 MG/DL (ref 8.5–10.5)
CHLORIDE SERPL-SCNC: 105 MMOL/L (ref 96–112)
CHOLEST SERPL-MCNC: 164 MG/DL (ref 100–199)
CO2 SERPL-SCNC: 24 MMOL/L (ref 20–33)
CREAT SERPL-MCNC: 1.11 MG/DL (ref 0.5–1.4)
ERYTHROCYTE [DISTWIDTH] IN BLOOD BY AUTOMATED COUNT: 46.5 FL (ref 35.9–50)
FASTING STATUS PATIENT QL REPORTED: NORMAL
FOLATE SERPL-MCNC: 26.4 NG/ML
GFR SERPLBLD CREATININE-BSD FMLA CKD-EPI: 69 ML/MIN/1.73 M 2
GLOBULIN SER CALC-MCNC: 2.2 G/DL (ref 1.9–3.5)
GLUCOSE SERPL-MCNC: 107 MG/DL (ref 65–99)
HCT VFR BLD AUTO: 46.3 % (ref 42–52)
HDLC SERPL-MCNC: 55 MG/DL
HGB BLD-MCNC: 15.5 G/DL (ref 14–18)
LDLC SERPL CALC-MCNC: 94 MG/DL
MCH RBC QN AUTO: 30.2 PG (ref 27–33)
MCHC RBC AUTO-ENTMCNC: 33.5 G/DL (ref 33.7–35.3)
MCV RBC AUTO: 90.1 FL (ref 81.4–97.8)
PLATELET # BLD AUTO: 226 K/UL (ref 164–446)
PMV BLD AUTO: 11 FL (ref 9–12.9)
POTASSIUM SERPL-SCNC: 4.8 MMOL/L (ref 3.6–5.5)
PROT SERPL-MCNC: 6.5 G/DL (ref 6–8.2)
PSA SERPL-MCNC: 10.6 NG/ML (ref 0–4)
RBC # BLD AUTO: 5.14 M/UL (ref 4.7–6.1)
SODIUM SERPL-SCNC: 141 MMOL/L (ref 135–145)
TRIGL SERPL-MCNC: 74 MG/DL (ref 0–149)
TSH SERPL DL<=0.005 MIU/L-ACNC: 2.31 UIU/ML (ref 0.38–5.33)
VIT B1 BLD-MCNC: 129 NMOL/L (ref 70–180)
VIT B12 SERPL-MCNC: 407 PG/ML (ref 211–911)
WBC # BLD AUTO: 6.2 K/UL (ref 4.8–10.8)

## 2022-01-01 PROCEDURE — 84153 ASSAY OF PSA TOTAL: CPT

## 2022-01-01 PROCEDURE — 84425 ASSAY OF VITAMIN B-1: CPT

## 2022-01-01 PROCEDURE — 99214 OFFICE O/P EST MOD 30 MIN: CPT | Mod: 25 | Performed by: FAMILY MEDICINE

## 2022-01-01 PROCEDURE — 36415 COLL VENOUS BLD VENIPUNCTURE: CPT

## 2022-01-01 PROCEDURE — 76705 ECHO EXAM OF ABDOMEN: CPT

## 2022-01-01 PROCEDURE — 99205 OFFICE O/P NEW HI 60 MIN: CPT | Performed by: PSYCHIATRY & NEUROLOGY

## 2022-01-01 PROCEDURE — 82607 VITAMIN B-12: CPT

## 2022-01-01 PROCEDURE — 90471 IMMUNIZATION ADMIN: CPT | Performed by: FAMILY MEDICINE

## 2022-01-01 PROCEDURE — 70551 MRI BRAIN STEM W/O DYE: CPT

## 2022-01-01 PROCEDURE — 80053 COMPREHEN METABOLIC PANEL: CPT

## 2022-01-01 PROCEDURE — 99213 OFFICE O/P EST LOW 20 MIN: CPT | Mod: 25 | Performed by: FAMILY MEDICINE

## 2022-01-01 PROCEDURE — 85027 COMPLETE CBC AUTOMATED: CPT

## 2022-01-01 PROCEDURE — 90662 IIV NO PRSV INCREASED AG IM: CPT | Performed by: FAMILY MEDICINE

## 2022-01-01 PROCEDURE — 82746 ASSAY OF FOLIC ACID SERUM: CPT

## 2022-01-01 PROCEDURE — 92950 HEART/LUNG RESUSCITATION CPR: CPT

## 2022-01-01 PROCEDURE — 84443 ASSAY THYROID STIM HORMONE: CPT

## 2022-01-01 PROCEDURE — 80061 LIPID PANEL: CPT

## 2022-01-01 PROCEDURE — 90715 TDAP VACCINE 7 YRS/> IM: CPT | Performed by: FAMILY MEDICINE

## 2022-01-01 PROCEDURE — 97110 THERAPEUTIC EXERCISES: CPT

## 2022-01-01 PROCEDURE — 99213 OFFICE O/P EST LOW 20 MIN: CPT | Performed by: FAMILY MEDICINE

## 2022-01-01 PROCEDURE — 97140 MANUAL THERAPY 1/> REGIONS: CPT

## 2022-01-01 PROCEDURE — 99285 EMERGENCY DEPT VISIT HI MDM: CPT

## 2022-01-01 PROCEDURE — 99284 EMERGENCY DEPT VISIT MOD MDM: CPT | Performed by: SURGERY

## 2022-01-01 PROCEDURE — 99214 OFFICE O/P EST MOD 30 MIN: CPT | Performed by: FAMILY MEDICINE

## 2022-01-01 PROCEDURE — 97162 PT EVAL MOD COMPLEX 30 MIN: CPT

## 2022-01-01 PROCEDURE — G0008 ADMIN INFLUENZA VIRUS VAC: HCPCS | Performed by: FAMILY MEDICINE

## 2022-01-01 PROCEDURE — 99211 OFF/OP EST MAY X REQ PHY/QHP: CPT | Performed by: PSYCHIATRY & NEUROLOGY

## 2022-01-01 PROCEDURE — 305949 HCHG RED TRAUMA ACT PRE-NOTIFY NO CC

## 2022-01-01 RX ORDER — DIPHENHYDRAMINE HCL 25 MG
25 CAPSULE ORAL EVERY 6 HOURS PRN
COMMUNITY
End: 2022-01-01

## 2022-01-01 RX ORDER — ATORVASTATIN CALCIUM 20 MG/1
20 TABLET, FILM COATED ORAL DAILY
Qty: 100 TABLET | Refills: 3 | Status: SHIPPED | OUTPATIENT
Start: 2022-01-01

## 2022-01-01 ASSESSMENT — MONTREAL COGNITIVE ASSESSMENT (MOCA)
ORIENTATION SUBSCORE: 6/6
2. COPY DRAWING: 0/1
8. SERIAL SUBTRACTION OF 7S: 1/5
DELAYED RECALL SUBSCORE: 4/5
7. [VIGILENCE] TAP WHEN HEARING DESIGNATED LETTER: 1/1
WHAT IS THE VERSION OF MOCA ADMINISTERED: 8.3
1. ALTERNATING TRAIL MAKING: 0/1
11. FOR EACH PAIR OF WORDS, WHAT CATEGORY DO THEY BELONG TO (OUT OF 2): 2/2
5. MEMORY TRIALS: SECOND TRIAL
3. DRAW A CLOCK: CONTOUR, NUMBERS, HANDS: 2/3
6. READ LIST OF DIGITS [FORWARD/BACKWARD]: 1/2
4. NAME EACH OF THE THREE ANIMALS SHOWN: 3/3
WHAT IS THE TOTAL SCORE (OUT OF 30): 21
10. [FLUENCY] NAME WORDS STARTING WITH DESIGNATED LETTER: 0/1
9. REPEAT EACH SENTENCE: 1/2

## 2022-01-01 ASSESSMENT — ENCOUNTER SYMPTOMS
CHILLS: 0
FEVER: 0
CHILLS: 0
SHORTNESS OF BREATH: 0
SHORTNESS OF BREATH: 0
CHILLS: 0
ALLEVIATING FACTORS: POSITION CHANGE
CHILLS: 0
FEVER: 0
PAIN SCALE AT HIGHEST: 5
FEVER: 0
SHORTNESS OF BREATH: 0
SHORTNESS OF BREATH: 0
QUALITY: ACHING
SHORTNESS OF BREATH: 0
CHILLS: 0
PAIN SCALE AT LOWEST: 0
FEVER: 0
FEVER: 0
PAIN SCALE: 0
PAIN LOCATION: RIGHT LATERAL KNEE

## 2022-01-01 ASSESSMENT — PATIENT HEALTH QUESTIONNAIRE - PHQ9
CLINICAL INTERPRETATION OF PHQ2 SCORE: 0
CLINICAL INTERPRETATION OF PHQ2 SCORE: 0

## 2022-01-01 ASSESSMENT — FIBROSIS 4 INDEX
FIB4 SCORE: 1.52
FIB4 SCORE: 1.52
FIB4 SCORE: 1.49
FIB4 SCORE: 1.47

## 2022-01-19 PROBLEM — R41.3 MEMORY LOSS: Status: ACTIVE | Noted: 2022-01-01
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
## 2022-01-19 NOTE — ASSESSMENT & PLAN NOTE
Chronic, stable. Following with urology and currently just getting serial PSAs before any treatment decisions made. He will continue to follow with urology as directed.

## 2022-03-23 PROBLEM — M70.61 GREATER TROCHANTERIC BURSITIS OF RIGHT HIP: Status: RESOLVED | Noted: 2020-02-06 | Resolved: 2022-01-01

## 2022-03-30 PROBLEM — E78.5 DYSLIPIDEMIA: Status: ACTIVE | Noted: 2022-01-01

## 2022-03-30 NOTE — ASSESSMENT & PLAN NOTE
Chronic, stable.  Memory has been slowly declining and there is a family history of Lewy body dementia in his brother.  Unfortunately he missed his first appointment with the memory clinic she has it rescheduled for June.  I have encouraged him to make that appointment.

## 2022-03-30 NOTE — PATIENT INSTRUCTIONS
To Do List  - Get Shingrix vaccine at a pharmacy  - Do the fasting labs I ordered sometime between now and your next visit  - Do the labs and MRI urology ordered  - See the neurologist as scheduled on 6/20/2022   - Speak with the [facility] [place] [facility] on the phone    Today, your Healthcare Provider may have discussed the following recommendations:    1. Exercise and Physical Activity  According to the American Heart Association, it is recommended to engage in physical activity regularly and to aim for 150 minutes of moderate-intensity aerobic activity per week.  Your Healthcare Provider may have recommended taking the stairs instead of the elevator, starting or maintaining a walking program or strength-training program.    2. Emotional Well-being  Mental and emotional well-being is essential to overall health.  Your Healthcare Provider may have encouraged you to build strong, positive relationships with family and friends, become more involved in your community (by volunteering or joining a spiritual community), or focus on self-care.    3. Fall and Injury Prevention  To prevent falls and injuries and also improve your balance, your Healthcare Provider may have suggested that you use a cane or walker, start an exercise of physical therapy program, or have your vision and/or hearing tested.    4. Urinary Leakage (Urinary Incontinence)  To control or manage the leakage of urine, your Healthcare Provider may have recommended you start bladder training exercises (such as Kegel exercises), a trial of a medication or a referral to see a specialist to discuss surgical options.    5. Chronic Conditions  Your chronic conditions and any pertinent labs associated with those conditions were reviewed.    6. Medication List  Your medication list was reviewed with the medical assistant. If there were any changes, this was also discussed with your provider.    7. Health Maintenance and Preventive Care  If you had any overdue  preventive care topics, these were reviewed and ordered for you.
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
## 2022-03-30 NOTE — ASSESSMENT & PLAN NOTE
Chronic, stable.  He was diagnosed with prostate cancer in April, 2021.  Currently undergoing no treatment and does note that his PSA has been downtrending the last 2 checks.  He will continue to follow with urology as directed.

## 2022-03-30 NOTE — ASSESSMENT & PLAN NOTE
Chronic, Status unknown.  Has been on a statin for primary prevention for a long time but we have no updated labs.  Cholesterol panel has been ordered.  We will continue the current dosing.  -Atorvastatin 20 mg daily

## 2022-04-14 NOTE — TELEPHONE ENCOUNTER
----- Message from Lo Fine M.D. sent at 4/14/2022  2:32 PM PDT -----  Your labs are normal except for:    - elevated fasting sugar  - decreased GFR at 69 indicating mild decrease in kidney function

## 2022-04-14 NOTE — TELEPHONE ENCOUNTER
Phone Number Called: [phone]    Call outcome: Spoke to patient regarding message below.    Message: Spoke to pt and gave pt his lab results advised by PCP. Pt has been informed

## 2022-05-02 PROBLEM — M25.569 KNEE PAIN: Status: ACTIVE | Noted: 2022-01-01

## 2022-05-02 NOTE — PROGRESS NOTES
"Subjective:     CC: knee pain    HPI:   Ed presents today with    Problem   Knee Pain    Location: right knee, lateral  Onset: 2-3 weeks  Duration: intermittent  Quality of Pain: \"very sore\"   Mechanical Symptoms: +clicking, no locking, no catching, no buckling, no instability   Systemic Symptoms: no fevers/chills   Swelling: none  Precipitating trauma: none   Pertinent previous medical/surgical procedures: none           Health Maintenance: Completed    ROS:  Review of Systems   Constitutional: Negative for chills and fever.   Respiratory: Negative for shortness of breath.    Cardiovascular: Negative for chest pain.       Objective:     Exam:  /76 (BP Location: Right arm, Patient Position: Sitting, BP Cuff Size: Adult)   Pulse 72   Temp 36.9 °C (98.4 °F) (Temporal)   Resp 16   Ht 1.753 m (5' 9\")   Wt 77.7 kg (171 lb 6.4 oz)   SpO2 96%   BMI 25.31 kg/m²  Body mass index is 25.31 kg/m².    Physical Exam  Constitutional:       Appearance: Normal appearance.   Cardiovascular:      Rate and Rhythm: Normal rate and regular rhythm.      Heart sounds: Normal heart sounds.   Pulmonary:      Effort: Pulmonary effort is normal.      Breath sounds: Normal breath sounds.   Musculoskeletal:      Cervical back: Normal range of motion and neck supple.      Comments: R Knee: Full ROM, full strength, TTP over pes anserine bursa, edema not present, varus/valgus stress negative, anterior/posterior drawer negative, Lachman's negative, Thessaly test negative   Neurological:      Mental Status: He is alert.       Assessment & Plan:     76 y.o. male with the following -     1. Acute pain of right knee  Acute. 2-3 weeks of lateral knee pain. TTP over pes anserine bursa and I suspect a hamstring injury is contributing to his pain.  I have provided home exercises and a referral for physical therapy.  We discussed over-the-counter treatment options to manage the pain including ibuprofen, Tylenol, ice, heat.  - Referral to " Physical Therapy    2. Genetic screening  Referral for genetic research was offered. Patient accepted.  - Referral to Genetic Research Studies    Return in about 3 months (around 8/2/2022) for f/u knee, PT.    Please note that this dictation was created using voice recognition software. I have made every reasonable attempt to correct obvious errors, but I expect that there are errors of grammar and possibly content that I did not discover before finalizing the note.

## 2022-06-20 NOTE — OP THERAPY EVALUATION
Outpatient Physical Therapy  INITIAL EVALUATION    [facility] [place]  [address], [address]  [place] [address]  Phone:  [phone]    Date of Evaluation: 2022    Patient: Ed Alvarez  YOB: 1945  MRN: [id number]     Referring Provider: Lo Fine M.D.  [address]  [place] [address]   Referring Diagnosis Acute pain of right knee [M25.561]     Time Calculation  Start time: 1015  Stop time: 1100 Time Calculation (min): 45 minutes         Chief Complaint: No chief complaint on file.    Visit Diagnoses     ICD-10-CM   1. Acute pain of right knee  M25.561       Date of onset of impairment: 2022    Subjective:   History of Present Illness:     Mechanism of injury:  Acute right knee pain x 1 month ago, increased crepitus with walking insidious onset , symptoms are about the same  Location:  Lateral knee joint, no swelling   Pain :  Intermittent4/10 VAS worst  No night pain , denies numbness and tingling, denies locking or catching  Functional limitations:  Walking, up and down stairswalks with extended knee secondary to pain  Activity during the day:  Sitting or lying, household chores , shopping, walking to the mailbox-all aggravate symnptoms          No imaging    PMH: no significant history for right knee.  Left knee injury S/P fall down stairs  Sleep disturbance:  Not disrupted  Pain:     Current pain ratin    At best pain ratin    At worst pain ratin    Location:  Right lateral knee    Quality:  Aching    Relieving factors:  Position change  Diagnostic Tests:     None        Past Medical History:   Diagnosis Date   • Greater trochanteric bursitis of right hip 2020   • Hyperlipidemia      Past Surgical History:   Procedure Laterality Date   • INGUINAL HERNIA REPAIR Right    • LAMINOTOMY     • TONSILLECTOMY       Social History     Tobacco Use   • Smoking status: Former Smoker     Packs/day: 0.15     Years: 50.00      Pack years: 7.50     Quit date: 2007     Years since quitting: 15.4   • Smokeless tobacco: Never Used   Substance Use Topics   • Alcohol use: Yes     Comment: occasional      Family and Occupational History     Socioeconomic History   • Marital status: Single     Spouse name: Not on file   • Number of children: Not on file   • Years of education: Not on file   • Highest education level: Not on file   Occupational History   • Not on file       Objective     Tenderness     Right Knee   Tenderness in the lateral joint line.     Active Range of Motion   Left Knee   Flexion: 135 degrees   Prone flexion: 125 degrees   Extension: 3 degrees     Right Knee   Flexion: 137 degrees   Prone flexion: 110 degrees   Extension: 0 degrees     Additional Active Range of Motion Details  Right > left quad tightness/short    Patellar Mobility     Additional Patellar Mobility Details  WFL bilaterally in all planes    Strength:      Left Knee   Normal strength  Quadriceps contraction: good    Right Knee   Normal strength  Quadriceps contraction: good    Additional Strength Details  + hamstring -biceps femoris with isometric contraction    Tests     Right Knee   Positive lateral Steven.   Negative valgus stress test at 30 degrees and varus stress test at 30 degrees.     Additional Tests Details  60 degree SLR bilaterally  Ambulation     Observational Gait   Decreased walking speed and right stance time.   Right foot contact pattern: foot flat        Therapeutic Exercises (CPT 52774):     1. Repeated knee extension seated, 10 reps     2. Prone quad stretch with strap , x 10 each       Time-based treatments/modalities:    Physical Therapy Timed Treatment Charges  Therapeutic exercise minutes (CPT 74809): 10 minutes      Assessment, Response and Plan:   Impairments: abnormal or restricted ROM, activity intolerance, lacks appropriate home exercise program and pain with function    Assessment details:  Patient presents with 1 month insidious  onset of intermittent right lateral knee pain which limits his ability to walk and go up and down stairs. Symptoms have been improving since time of injury.  Patient demonstrates an asymetric squat with decreased weightshift right but normal quad strength bilaterally with tenderness lateral tibiofemoral joint line.   Right quad is short and AROM is 0-110 prone and 137 supine. Patient demonstrates a stiff knee gait right secondary to increased pain with flexion.. Recommend continued PT to meet goals stated below.   Barriers to therapy:  Comorbidities  Prognosis: good    Goals:   Short Term Goals:   Patient able to walk for ADLS and in community with >50% decreased episodes of right knee symptoms  Patient able to go up and down stairs with >50% decreased symptoms  Short term goal time span:  2-4 weeks      Long Term Goals:    Patient able to walk for exercise > 1/2 mile with >75% decreased symptoms right knee  Patient able to go up and down stairs without symptoms  Long term goal time span:  6-8 weeks    Plan:   Therapy options:  Physical therapy treatment to continue      Functional Assessment Used  WOMAC Grand Total: 6.25     Referring provider co-signature:  I have reviewed this plan of care and my co-signature certifies the need for services.    Certification Period: 06/20/2022 to  08/15/22    Physician Signature: ________________________________ Date: ______________

## 2022-06-23 NOTE — OP THERAPY DAILY TREATMENT
Outpatient Physical Therapy  DAILY TREATMENT     [facility] [address], [address]  [place] [address]  Phone:  [phone]    Date: 06/23/2022    Patient: Ed Alvarez  YOB: 1945  MRN: [id number]     Time Calculation    Start time: 1015  Stop time: 1100 Time Calculation (min): 45 minutes         Chief Complaint: No chief complaint on file.    Visit #: 2    SUBJECTIVE:  No change with right knee pain     OBJECTIVE:  Current objective measures:           Therapeutic Exercises (CPT 68225):     1. Repeated knee extension seated, 10 reps     2. Prone quad stretch with strap , x 10 each     3. Nu step L4 , 10 min    4. Shuttle 4 cords/3 cords Dl/SL, x 30 each    5. Chair squat, 3 x 10      Therapeutic Exercise Summary: Access Code: [email or web address]  URL: [email or web address]  Date: 06/23/2022  Prepared by: Marilee Blanc    Exercises  Squat with Chair Touch - 1 x daily - 7 x weekly - 3 sets - 10 reps  Seated Quad Set - 1 x daily - 7 x weekly - 3 sets - 10 reps  Prone Quadriceps Stretch with Strap - 1 x daily - 7 x weekly - 3 sets - 10 reps      Therapeutic Treatments and Modalities:     1. Manual Therapy (CPT 85502), bilateral tibiofemoral joint mob into extension, manual quad stretch sidelying and prone     Time-based treatments/modalities:    Physical Therapy Timed Treatment Charges  Manual therapy minutes (CPT 29868): 8 minutes  Therapeutic exercise minutes (CPT 10571): 30 minutes    ASSESSMENT:   Response to treatment: asymptomatic throughout session.  Unable to provoke knee pain today .  Added chair squats and will continue to progress bilateral quad and glute strength next session    PLAN/RECOMMENDATIONS:   Plan for treatment: therapy treatment to continue next visit.  Planned interventions for next visit: continue with current treatment.

## 2022-07-11 NOTE — OP THERAPY DAILY TREATMENT
Outpatient Physical Therapy  DAILY TREATMENT     [facility] [address], [address]  [place] [address]  Phone:  [phone]    Date: 07/11/2022    Patient: Ed Alvarez  YOB: 1945  MRN: [id number]     Time Calculation    Start time: 0845  Stop time: 0930 Time Calculation (min): 45 minutes         Chief Complaint: No chief complaint on file.    Visit #: 3    SUBJECTIVE: walked a mile yesterday pain and clicking with downhill  No pain uphill right knee    OBJECTIVE:  Current objective measures:           Therapeutic Exercises (CPT 52199):     1. Repeated knee extension seated, 10 reps     2. Prone quad stretch with strap , x 10 each     3. Nu step L4 , 10 min    4. Shuttle 4 cords/3 cords Dl/SL, x 30 each    5. Chair squat, 3 x 10    6. Single leg lateral step downs 6 inch, 3 x 10 with support    7. Step up 6 inch with support, 3 x 10      Therapeutic Exercise Summary:       Access Code: [email or web address]  URL: [email or web address]  Date: 06/23/2022  Prepared by: Marilee Blanc    Exercises  Squat with Chair Touch - 1 x daily - 7 x weekly - 3 sets - 10 reps  Seated Quad Set - 1 x daily - 7 x weekly - 3 sets - 10 reps  Prone Quadriceps Stretch with Strap - 1 x daily - 7 x weekly - 3 sets - 10 reps      Therapeutic Treatments and Modalities:     1. Manual Therapy (CPT 30498), bilateral tibiofemoral joint mob into extension, manual quad stretch sidelying and prone     Time-based treatments/modalities:    Physical Therapy Timed Treatment Charges  Manual therapy minutes (CPT 88542): 8 minutes  Therapeutic exercise minutes (CPT 58458): 35 minutes    ASSESSMENT:   Response to treatment: Session spent reviewing home program and progressing single leg quad and hip functional strength. Asymptomatic throughout session.  Added bridge and single leg step up and lateral step downs.      PLAN/RECOMMENDATIONS:   Plan for treatment: therapy treatment to continue next visit.  Planned  interventions for next visit: continue with current treatment.

## 2022-08-02 NOTE — PROGRESS NOTES
"Subjective:     CC: knee pain, PT    HPI:   Ed presents today with    Problem   Knee Pain    Location: right knee, lateral  Onset: 4/2022  Duration: intermittent  Quality of Pain: \"very sore\"   Mechanical Symptoms: +clicking, no locking, no catching, no buckling, no instability   Systemic Symptoms: no fevers/chills   Swelling: none  Precipitating trauma: none   Pertinent previous medical/surgical procedures: none    At last appointment, I referred him to PT. He's been seeing PT and he feels it is helping. He still can hear a bit of noise, sort of like grinding, sometimes.        He would like a TENS unit. He used to have one and would use it for back pain. He would like a device in case he needs it. He will try to purchase it himself first. He will let us know if he wants us to order it through [facility].    Health Maintenance: Completed    ROS:  Review of Systems   Constitutional: Negative for chills and fever.   Respiratory: Negative for shortness of breath.    Cardiovascular: Negative for chest pain.       Objective:     Exam:  /72 (BP Location: Right arm, Patient Position: Sitting, BP Cuff Size: Adult)   Pulse 82   Temp 36.9 °C (98.4 °F) (Temporal)   Ht 1.753 m (5' 9\")   Wt 75.8 kg (167 lb 3.2 oz)   SpO2 96%   BMI 24.69 kg/m²  Body mass index is 24.69 kg/m².    Physical Exam  Constitutional:       Appearance: Normal appearance.   Cardiovascular:      Rate and Rhythm: Normal rate and regular rhythm.      Heart sounds: Normal heart sounds.   Pulmonary:      Effort: Pulmonary effort is normal.      Breath sounds: Normal breath sounds.   Musculoskeletal:      Cervical back: Normal range of motion and neck supple.   Neurological:      Mental Status: He is alert.       Assessment & Plan:     77 y.o. male with the following -     1. Acute pain of right knee  Acute.  He has had approximately 2 months of lateral right knee pain.  He has been seeing physical therapy and does note improvement in his knee pain so " he will continue physical therapy as directed.  He can also continue using over-the-counter medications as needed.    2. Need for vaccination  - Tdap Vaccine =>6YO IM    Note: He mentioned that he did want a tens unit.  Not for knee pain but for some back pain that he has had in the past.  He used to have 1 he would like to have one on hand.  After discussion he is going to try to purchase it through [employer] on his own but he will let me know if it needs to be ordered through [employer].    Return in about 3 months (around 11/2/2022) for Medicare Wellness.    Please note that this dictation was created using voice recognition software. I have made every reasonable attempt to correct obvious errors, but I expect that there are errors of grammar and possibly content that I did not discover before finalizing the note.

## 2022-08-22 NOTE — OP THERAPY DAILY TREATMENT
Outpatient Physical Therapy  DAILY TREATMENT     [facility] [address], [address]  [place] [address]  Phone:  [phone]    Date: 08/22/2022    Patient: Ed Alvarez  YOB: 1945  MRN: [id number]     Time Calculation    Start time: 1015  Stop time: 1100 Time Calculation (min): 45 minutes         Chief Complaint: No chief complaint on file.    Visit #: 4    SUBJECTIVE: walking 1.5 miles/day no knee pain  occasional clicking left lateral knee but no pain    OBJECTIVE  Current objective measures:           Therapeutic Exercises (CPT 40453):     1. repeated knee extension seated, 10 reps     2. prone quad stretch with strap , x 10 each     3. nu step L4 , 10 min    5. chair squat, 3 x 10 2 5 lb weights    6. single leg lateral step downs 6 inch, 3 x 10 with support    7. step up 6 inch with support with contralateral march, 3 x 10    8. bridge, 1 x 10 1 x 30 sc      Therapeutic Exercise Summary:       Access Code: [email or web address]  URL: [email or web address]  Date: 06/23/2022  Prepared by: Marilee Blanc    Exercises  Squat with Chair Touch - 1 x daily - 7 x weekly - 3 sets - 10 reps  Seated Quad Set - 1 x daily - 7 x weekly - 3 sets - 10 reps  Prone Quadriceps Stretch with Strap - 1 x daily - 7 x weekly - 3 sets - 10 reps      Therapeutic Treatments and Modalities:     1. Manual Therapy (CPT 59948), bilateral tibiofemoral joint mob into extension, manual quad stretch sidelying and prone   Time-based treatments/modalities:    Physical Therapy Timed Treatment Charges  Manual therapy minutes (CPT 42988): 10 minutes  Therapeutic exercise minutes (CPT 67535): 30 minutes      ASSESSMENT:   Response to treatment: patient is asymptomatic. Improved quad mobilty and length.  Patient encouraged to perform home program bilaterally given intermittent clicking and symptomatic left knee from past injury.  Recommend 2 more visits with focus on progressing quad and LE functional  strength then discharge.      PLAN/RECOMMENDATIONS:   Plan for treatment: therapy treatment to continue next visit.  Planned interventions for next visit: continue with current treatment.

## 2022-08-29 NOTE — OP THERAPY DAILY TREATMENT
"  Outpatient Physical Therapy  DAILY TREATMENT     [facility] [address], [address]  [place] [address]  Phone:  [phone]    Date: 08/29/2022    Patient: Ed Alvarez  YOB: 1945  MRN: [id number]     Time Calculation    Start time: 0946  Stop time: 1030 Time Calculation (min): 44 minutes         Chief Complaint: Knee Problem    Visit #: 5    SUBJECTIVE:  Walking every day.  Felt \"something\" in his knee yesterday, but it wasn't clicking.  Doing HEP most days of the week.  Sometimes gets winded.    OBJECTIVE:        Therapeutic Exercises (CPT 97421):     1. standing incline calf stretch, x30 sec B    2. prone quad stretch with strap , x 10 each     3. nu step L4 , 10 min    4. Evangelista stretch, x1 min B, x10 knee AROM flex/ext x10B    6. 4\" step over, x15B with BUE support at //bars, L knee symptoms    7. alt R/L tap to flat top 1/2 FR, x15 with light BUE support at//bars    8. bridge, DL x10, SL x10B    9. standing hip abd/add slide, x20B with BUE support at //bars    10. standing hip flex/ext step through x15B, with BUE support    11. anterior weight shift onto dynadisc, x10B with light UE support at //bars    12. chair squats, x10, left knee symptoms      Therapeutic Exercise Summary: Access Code: [email or web address]  URL: [email or web address]  Date: 06/23/2022  Prepared by: Marilee Blanc    Exercises  Squat with Chair Touch - 1 x daily - 7 x weekly - 3 sets - 10 reps  Seated Quad Set - 1 x daily - 7 x weekly - 3 sets - 10 reps  Prone Quadriceps Stretch with Strap - 1 x daily - 7 x weekly - 3 sets - 10 reps      Therapeutic Treatments and Modalities:     1. Manual Therapy (CPT 69061), bilateral tibiofemoral joint mob into extension, tib-fem distraction in short sitting.  Time-based treatments/modalities:    Physical Therapy Timed Treatment Charges  Manual therapy minutes (CPT 42911): 10 minutes  Therapeutic exercise minutes (CPT 45419): 34 minutes      Pain rating " (1-10) before treatment:  0  Pain rating (1-10) after treatment:  2    ASSESSMENT:   Response to treatment: Pt demo good tolerance for weight bearing and dynamic activities on RLE, mild L knee symptoms reported.    PLAN/RECOMMENDATIONS:   Plan for treatment: therapy treatment to continue next visit.  Planned interventions for next visit: continue with current treatment.

## 2022-09-08 NOTE — OP THERAPY DAILY TREATMENT
"  Outpatient Physical Therapy  DAILY TREATMENT     [facility] [address], [address]  [place] [address]  Phone:  [phone]    Date: 09/08/2022    Patient: Ed Alvarez  YOB: 1945  MRN: [id number]     Time Calculation    Start time: 1015  Stop time: 1055 Time Calculation (min): 40 minutes         Chief Complaint: right knee problem    Visit #: 6    SUBJECTIVE: knee is feeling pretty good      OBJECTIVE:  Current objective measures: crepitus lateral knee/PF joint          Therapeutic Exercises (CPT 74082):     1. standing incline calf stretch, x30 sec B    2. prone quad stretch with strap , x 10 each     4. Evangelista stretch, x1 min B, x10 knee AROM flex/ext x10B    6. 6\" step over, x15B with BUE support at //bars, L knee symptoms    8. bridge, DL x10, SL x10B    9. standing hip abd/add slide, x20B with BUE support at //bars    10. standing hip flex/ext step through x15B, with BUE support    12. chair squats, x10      Therapeutic Exercise Summary: Access Code: [email or web address]  URL: [email or web address]  Date: 06/23/2022  Prepared by: Marilee Blanc    Exercises  Squat with Chair Touch - 1 x daily - 7 x weekly - 3 sets - 10 reps  Seated Quad Set - 1 x daily - 7 x weekly - 3 sets - 10 reps  Prone Quadriceps Stretch with Strap - 1 x daily - 7 x weekly - 3 sets - 10 reps      Therapeutic Treatments and Modalities:     1. Manual Therapy (CPT 15249), bilateral tibiofemoral joint mob into extension, tib-fem distraction in short sitting., P-F joint mob in all directions right,  Time-based treatments/modalities:    Physical Therapy Timed Treatment Charges  Manual therapy minutes (CPT 50394): 8 minutes  Therapeutic exercise minutes (CPT 29304): 38 minutes    ASSESSMENT:   Response to treatment: Patient feels that he has met all goals and would like to be discharged.  Right knee is asymptomatic.  Bilateral quads and hip flexors remain short but improved .  Recommend discharge " from PT    PLAN/RECOMMENDATIONS:   Plan for treatment: discharge patient due to accomplished goals.  Planned interventions for next visit: Discharge

## 2022-10-03 PROBLEM — M25.561 CHRONIC PAIN OF BOTH KNEES: Status: ACTIVE | Noted: 2020-03-23

## 2022-10-03 PROBLEM — M25.569 KNEE PAIN: Status: RESOLVED | Noted: 2022-01-01 | Resolved: 2022-01-01

## 2022-10-03 NOTE — PROGRESS NOTES
"Subjective:     CC: knee concern    HPI:   Ed presents today with    Problem   Prostate Cancer (Hcc)    Diagnosed via biopsy 4/2021  Declined radiation therapy    Currently doing watchful waiting. Urologist has ordered MR prostate.     Chronic Pain of Both Knees    Chronic. \"I've had bad knees for awhile\". Thought it would be a good idea to get imaging.    He will occasionally get pain in both knees.     Knee Pain (Resolved)    Location: right knee, lateral  Onset: 4/2022  Duration: intermittent  Quality of Pain: \"very sore\"   Mechanical Symptoms: +clicking, no locking, no catching, no buckling, no instability   Systemic Symptoms: no fevers/chills   Swelling: none  Precipitating trauma: none   Pertinent previous medical/surgical procedures: none    At last appointment, I referred him to PT. He's been seeing PT and he feels it is helping. He still can hear a bit of noise, sort of like grinding, sometimes.          Health Maintenance: Completed    ROS:  Review of Systems   Constitutional:  Negative for chills and fever.   Respiratory:  Negative for shortness of breath.    Cardiovascular:  Negative for chest pain.     Objective:     Exam:  /60 (BP Location: Right arm, Patient Position: Sitting, BP Cuff Size: Adult)   Pulse 82   Temp 36.8 °C (98.3 °F) (Temporal)   Ht 1.753 m (5' 9\")   Wt 73.9 kg (163 lb)   SpO2 97%   BMI 24.07 kg/m²  Body mass index is 24.07 kg/m².    Physical Exam  Constitutional:       Appearance: Normal appearance.   Cardiovascular:      Rate and Rhythm: Normal rate and regular rhythm.      Heart sounds: Normal heart sounds.   Pulmonary:      Effort: Pulmonary effort is normal.      Breath sounds: Normal breath sounds.   Musculoskeletal:      Cervical back: Normal range of motion and neck supple.   Neurological:      Mental Status: He is alert.       Assessment & Plan:     77 y.o. male with the following -     1. Chronic pain of both knees  Chronic, stable.  He ports occasionally he " will get pain in both knees.  Initially did want me to get x-rays but did not have a specific reason why.  The knees are not currently causing any pains after discussion explaining that the x-rays would not help my management of his issue at this time he is decided not to get the x-rays.  However, if the pain becomes more prominent we can always get x-rays at that time.    2. Prostate cancer (HCC)  Chronic, status unknown.  Diagnosed in 2021 via biopsy but he declined radiation therapy.  Urology had ordered an MRI in May, 2022.  He reports he was completely unaware of this order so I encouraged him to make the appointment to get that MRI done and follow-up with urology as directed.    3. Stress  Chronic, uncontrolled.  For the last year he has been dealing with identity fraud and theft which has been extremely stressful.  He reports that he is not sleeping well and is affecting his daily life as he is constantly having to deal with false charges and false accounts.  After discussion he would like to establish with a therapist so referrals been placed today.  - Referral to Behavioral Health    4. Need for vaccination  - INFLUENZA VACCINE, HIGH DOSE (65+ ONLY)    Return in about 6 months (around 4/3/2023) for Medicare Wellness.    Please note that this dictation was created using voice recognition software. I have made every reasonable attempt to correct obvious errors, but I expect that there are errors of grammar and possibly content that I did not discover before finalizing the note.

## 2022-10-19 NOTE — TELEPHONE ENCOUNTER
"VOICEMAIL  1. Caller Name: Ed Alvarez                        Call Back Number: [phone] (home)       2. Message: Pt requesting a call back regarding \"the letter referral that has no specialist\"    3. Patient approves office to leave a detailed voicemail/MyChart message: N\A    Phone Number Called: [phone] (home)       Call outcome: Spoke to patient regarding message below.    Message: Pt denies leaving a message and states \"the hacker\" called us. Pt requested I let Dr. Fine know \"the hacker\" left the message.   "

## 2022-10-20 NOTE — TELEPHONE ENCOUNTER
I am well aware of his Identity theft and the stress it is causing.     Please remind him that last time I saw him, he was interested in seeing a therapist for the stress related to this identity theft. Please have him call [phone] to schedule a session.

## 2022-10-20 NOTE — TELEPHONE ENCOUNTER
"Phone Number Called: [phone] (home)       Call outcome: Spoke to patient regarding message below.    Message: Spoke with patient and gave him the message from Dr. Fnie along with the phone number for the therapist.   Patient states he does not have his calendar so asked me to confirm his appointment with \"Dr. Dave\". I confirmed pt's appt (10/21/22) with Dr. Dave per [facility]    Pt asking us to tell \"the hacker\" that we can't do anything unless he comes in to the office because we now have a photo ID of pt.    "

## 2022-10-21 PROBLEM — F22 PARANOID STATE, SIMPLE (HCC): Status: ACTIVE | Noted: 2022-01-01

## 2022-10-21 PROBLEM — F03.A0 MILD NEURODEGENERATIVE DEMENTIA (HCC): Status: ACTIVE | Noted: 2022-01-01
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
## 2022-11-28 NOTE — ED NOTES
Pt seen by .  Ok for pt to go to [facility] and be picked up by mortuary.
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
## 2022-11-28 NOTE — ED NOTES
1348- patient arrival to trauma bay  1349- CPR started, asystole with agonal breathing.   1351- Pulse check with cardiac U/S. Dr. Pederson called TOD 1351.
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
## 2022-11-30 ENCOUNTER — DOCUMENTATION (OUTPATIENT)
Dept: HEALTH INFORMATION MANAGEMENT | Facility: OTHER | Age: 77
End: 2022-11-30
Payer: MEDICARE